# Patient Record
Sex: MALE | Race: WHITE | Employment: OTHER | URBAN - METROPOLITAN AREA
[De-identification: names, ages, dates, MRNs, and addresses within clinical notes are randomized per-mention and may not be internally consistent; named-entity substitution may affect disease eponyms.]

---

## 2017-03-31 ENCOUNTER — OFFICE VISIT (OUTPATIENT)
Dept: SURGERY | Age: 72
End: 2017-03-31

## 2017-03-31 VITALS
SYSTOLIC BLOOD PRESSURE: 124 MMHG | BODY MASS INDEX: 28.14 KG/M2 | WEIGHT: 201 LBS | OXYGEN SATURATION: 95 % | HEART RATE: 64 BPM | DIASTOLIC BLOOD PRESSURE: 56 MMHG | HEIGHT: 71 IN

## 2017-03-31 DIAGNOSIS — K43.2 RECURRENT INCISIONAL HERNIA: Primary | ICD-10-CM

## 2017-03-31 DIAGNOSIS — S31.109A OPEN ABDOMINAL WALL WOUND, INITIAL ENCOUNTER: ICD-10-CM

## 2017-03-31 RX ORDER — VITAMIN B COMPLEX
2500 TABLET ORAL DAILY
COMMUNITY
End: 2021-11-10 | Stop reason: CLARIF

## 2017-03-31 RX ORDER — GUAIFENESIN 100 MG/5ML
81 LIQUID (ML) ORAL DAILY
COMMUNITY
End: 2021-11-10 | Stop reason: CLARIF

## 2017-03-31 RX ORDER — TRAMADOL HYDROCHLORIDE 50 MG/1
50 TABLET ORAL
COMMUNITY

## 2017-03-31 RX ORDER — MELOXICAM 15 MG/1
15 TABLET ORAL DAILY
COMMUNITY
End: 2021-11-10 | Stop reason: CLARIF

## 2017-04-28 ENCOUNTER — OFFICE VISIT (OUTPATIENT)
Dept: SURGERY | Age: 72
End: 2017-04-28

## 2017-04-28 VITALS
BODY MASS INDEX: 27.79 KG/M2 | SYSTOLIC BLOOD PRESSURE: 113 MMHG | HEART RATE: 66 BPM | DIASTOLIC BLOOD PRESSURE: 62 MMHG | HEIGHT: 71 IN | OXYGEN SATURATION: 95 % | RESPIRATION RATE: 14 BRPM | WEIGHT: 198.5 LBS

## 2017-04-28 DIAGNOSIS — K43.2 RECURRENT INCISIONAL HERNIA: Primary | ICD-10-CM

## 2017-04-28 NOTE — PROGRESS NOTES
HISTORY OF PRESENT ILLNESS  Akin Sol is a 74 y.o. male who comes in for follow up for a chronic abdominal wall wound  Follow-up   Pertinent negatives include no chest pain, no abdominal pain, no headaches and no shortness of breath. He has had extensive abdominal operations and has a chronic abdominal wall for the last 6 months. He previously had infected mesh removed in the mid 2000s and had Strattice biological mesh placed. He developed redness and a scab over the upper midline. The scab will open and drain and then scab over. There is not purulent drainage. He saw Dr Marilee Kilpatrick who referred him here and I saw him 3/31/2017. I excised some scar tissue and it has now healed over. It is not painful and he denies fever, chills or sweats, abdominal pain, nausea, vomiting, diarrhea, constipation, melena, or hematochezia, dysuria, hematuria. He saw Dr aMrilee Kilpatrick and cultures noted staph (no actual report). He was placed on doxycycline. [place]. Past Medical History:   Diagnosis Date    Arthritis     CAD (coronary artery disease)     Diabetes ([facility] [address]     Hypertension     Other ill-defined conditions     pt had mesh removed from his abdomen which was causing an infection, antibiotics were used and recently pt had old abdominal incision from hernia repair which also started to ooze. ..saw primary care md arizmendi and he pulled out an old suture and cultured the drainage and was positive for mrsa     Past Surgical History:   Procedure Laterality Date    ABDOMEN SURGERY PROC UNLISTED  5664-60    hernia repair x3    CARDIAC SURG PROCEDURE UNLIST  2008    cabg    HX GI  1985    gastric bypass    HX GI  2004    colon resection    HX GI  2009    mesh removed    HX ORTHOPAEDIC  2000    left torn quad repair    HX ORTHOPAEDIC  2001    rotator cuff rt    HX ORTHOPAEDIC  2007/08     r hip replaced x2    HX ORTHOPAEDIC  2009    r shoulder replaced     Family History Problem Relation Age of Onset    Heart Disease Mother     Hypertension Mother     Diabetes Mother     Cancer Mother     Stroke Mother     Heart Disease Father     Cancer Father      Social History   Substance Use Topics    Smoking status: Former Smoker    Smokeless tobacco: None    Alcohol use No     Current Outpatient Prescriptions   Medication Sig    meloxicam (MOBIC) 15 mg tablet Take 15 mg by mouth daily.  traMADol (ULTRAM) 50 mg tablet Take 50 mg by mouth every six (6) hours as needed for Pain.  aspirin 81 mg chewable tablet Take 81 mg by mouth daily.  cyanocobalamin (VITAMIN B-12) 2,500 mcg sublingual tablet Take 2,500 mcg by mouth daily.  rosuvastatin (CRESTOR) 20 mg tablet Take 20 mg by mouth daily.  amlodipine-benazepril (LOTREL) 5-20 mg per capsule Take 1 Cap by mouth daily.  amitriptyline (ELAVIL) 50 mg tablet Take  by mouth nightly.  sitagliptin (JANUVIA) 100 mg tablet Take 100 mg by mouth daily.  metoprolol (LOPRESSOR) 50 mg tablet Take  by mouth two (2) times a day.  OMEPRAZOLE MAGNESIUM (PRILOSEC OTC PO) Take  by mouth.  omega 3-dha-epa-fish oil (FISH OIL) 100-160-1,000 mg cap Take  by mouth.  hydrocodone-acetaminophen (LORTAB) 5-500 mg per tablet Take 1-2 Tabs by mouth as directed. every 4-6 hrs prn pain.  naproxen (NAPROSYN) 375 mg tablet Take 375 mg by mouth two (2) times daily (with meals).  benazepril (LOTENSIN) 20 mg tablet Take 20 mg by mouth daily.  multivitamin, stress formula (STRESS TAB) tablet Take 1 Tab by mouth daily. No current facility-administered medications for this visit. Allergies   Allergen Reactions    Hydrocodone Itching       Review of Systems   Constitutional: Negative for chills, diaphoresis, fever, malaise/fatigue and weight loss. HENT: Negative for congestion, ear pain and sore throat. Eyes: Negative for blurred vision and pain.    Respiratory: Negative for cough, hemoptysis, sputum production, shortness of breath, wheezing and stridor. Sleep apnea   Cardiovascular: Negative for chest pain, palpitations, orthopnea, claudication, leg swelling and PND. Gastrointestinal: Positive for heartburn. Negative for abdominal pain, blood in stool, constipation, diarrhea, melena, nausea and vomiting. Genitourinary: Negative for dysuria, flank pain, frequency, hematuria and urgency. Musculoskeletal: Positive for back pain and joint pain. Negative for myalgias and neck pain. Skin: Negative for itching and rash. Neurological: Negative for dizziness, tremors, focal weakness, seizures, weakness and headaches. Endo/Heme/Allergies: Negative for polydipsia. Bruises/bleeds easily. Psychiatric/Behavioral: Negative for depression and memory loss. The patient is not nervous/anxious. Visit Vitals    /62    Pulse 66    Resp 14    Ht 5' 11\" (1.803 m)    Wt 90 kg (198 lb 8 oz)    SpO2 95%    BMI 27.69 kg/m2       Physical Exam   Constitutional: He is oriented to person, place, and time. He appears well-developed and well-nourished. No distress. HENT:   Head: Normocephalic and atraumatic. Mouth/Throat: Oropharynx is clear and moist. No oropharyngeal exudate. Eyes: Conjunctivae and EOM are normal. Pupils are equal, round, and reactive to light. No scleral icterus. Neck: Normal range of motion. Neck supple. No tracheal deviation present. No thyromegaly present. Cardiovascular: Normal rate, regular rhythm and normal heart sounds. Exam reveals no gallop and no friction rub. No murmur heard. Pulmonary/Chest: Effort normal and breath sounds normal. No stridor. No respiratory distress. He has no wheezes. He has no rales. Abdominal: Soft. Normal appearance and bowel sounds are normal. He exhibits no distension, no pulsatile liver and no mass. There is no hepatosplenomegaly. There is no tenderness. There is no rebound, no guarding and no CVA tenderness. A hernia is present.  Hernia confirmed positive in the ventral area (upper midline hernia). Hernia confirmed negative in the right inguinal area and confirmed negative in the left inguinal area. Genitourinary: Testes normal. Cremasteric reflex is present. Circumcised. Musculoskeletal: Normal range of motion. He exhibits no edema or tenderness. Lymphadenopathy:     He has no cervical adenopathy. Right: No inguinal adenopathy present. Left: No inguinal adenopathy present. Neurological: He is alert and oriented to person, place, and time. No cranial nerve deficit. Coordination normal.   Skin: Skin is warm and dry. No rash noted. He is not diaphoretic. No erythema. Psychiatric: He has a normal mood and affect. His behavior is normal. Judgment and thought content normal.       ASSESSMENT and PLAN  1. 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 Hx infected mesh removal.  ???foreign body reaction. This looks self limited and not a significant process. We discussed options for ongoing observation vs exploration. It is no longer draining  Observation and return to clinic if it recurs  2. Recurrent incisional hernia. Minimally symptomatic and low risk for incarceration/strangulation. I explained about the anatomy and pathophysiology of hernias and the risk of incarceration and strangulation of the bowel. I explained about hernia repairs (open with and without mesh, and robotic assisted and laparoscopic with mesh). I explained the risks and benefits of repair including bleeding, infection, chronic pain, orchalgia, loss of testes, bowel or bladder injury, hernia recurrence, seroma, mesh infection requiring removal.  I explained it would be a six to eight week recuperation with no driving for 5 - 7 days, no lifting for six weeks.     He wishes to proceed with observation    RTC prn    Cammie Moran MD FACS

## 2017-04-28 NOTE — MR AVS SNAPSHOT
Visit Information Date & Time Provider Department Dept. Phone Encounter #  
 4/28/2017 10:20 AM Sanjuanita Padgett MD [facility] [place] [id number] Upcoming Health Maintenance Date Due  
 LIPID PANEL Q1 1945 FOOT EXAM Q1 1/12/1955 MICROALBUMIN Q1 1/12/1953 EYE EXAM RETINAL OR DILATED Q1 1/12/1953 DTaP/Tdap/Td series (1 - Tdap) 1/12/1964 FOBT Q 1 YEAR AGE 50-75 1/12/1993 ZOSTER VACCINE AGE 60> 1/12/2003 GLAUCOMA SCREENING Q2Y 1/12/2008 Pneumococcal 65+ Low/Medium Risk (1 of 2 - PCV13) 1/12/2008 MEDICARE YEARLY EXAM 1/12/2008 HEMOGLOBIN A1C Q6M 5/18/2011 INFLUENZA AGE 9 TO ADULT 8/1/2016 Allergies as of 4/28/2017  Review Complete On: 4/28/2017 By: Sanjuanita Padgett MD  
  
 Severity Noted Reaction Type Reactions Hydrocodone Low 12/08/2010   Side Effect Itching Current Immunizations  Never Reviewed No immunizations on file. Not reviewed this visit Vitals BP Pulse Resp Height(growth percentile) Weight(growth percentile) SpO2  
 113/62 66 14 5' 11\" (1.803 m) 198 lb 8 oz (90 kg) 95% BMI Smoking Status 27.69 kg/m2 Former Smoker Vitals History BMI and BSA Data Body Mass Index Body Surface Area  
 27.69 kg/m 2 2.12 m 2 Preferred Pharmacy Pharmacy Name Phone [facility], [address] [place] [address] [phone] Your Updated Medication List  
  
   
This list is accurate as of: 4/28/17 10:44 AM.  Always use your most recent med list.  
  
  
  
  
 amitriptyline 50 mg tablet Commonly known as:  ELAVIL Take  by mouth nightly. amLODIPine-benazepril 5-20 mg per capsule Commonly known as:  Yina Landsberg Take 1 Cap by mouth daily. aspirin 81 mg chewable tablet Take 81 mg by mouth daily. benazepril 20 mg tablet Commonly known as:  LOTENSIN Take 20 mg by mouth daily. CRESTOR 20 mg tablet Generic drug:  rosuvastatin Take 20 mg by mouth daily. FISH -160-1,000 mg Cap Generic drug:  omega 3-dha-epa-fish oil Take  by mouth. HYDROcodone-acetaminophen 5-500 mg per tablet Commonly known as:  [address] Take 1-2 Tabs by mouth as directed. every 4-6 hrs prn pain. JANUVIA 100 mg tablet Generic drug:  SITagliptin Take 100 mg by mouth daily. meloxicam 15 mg tablet Commonly known as:  MOBIC Take 15 mg by mouth daily. metoprolol tartrate 50 mg tablet Commonly known as:  LOPRESSOR Take  by mouth two (2) times a day. multivitamin, stress formula tablet Commonly known as:  STRESS TAB Take 1 Tab by mouth daily. naproxen 375 mg tablet Commonly known as:  NAPROSYN Take 375 mg by mouth two (2) times daily (with meals). PRILOSEC OTC PO Take  by mouth. traMADol 50 mg tablet Commonly known as:  ULTRAM  
Take 50 mg by mouth every six (6) hours as needed for Pain. VITAMIN B-12 2,500 mcg sublingual tablet Generic drug:  cyanocobalamin Take 2,500 mcg by mouth daily. Introducing [facility]! [facility] introduces [employer] patient portal. Now you can access parts of your medical record, email your doctor's office, and request medication refills online. 1. In your internet browser, go to [email or web address] 2. Click on the First Time User? Click Here link in the Sign In box. You will see the New Member Sign Up page. 3. Enter your [employer] Access Code exactly as it appears below. You will not need to use this code after youve completed the sign-up process. If you do not sign up before the expiration date, you must request a new code. · [employer] Access Code: [email or web address] Expires: 6/29/2017 10:35 AM 
 
4. Enter the last four digits of your Social Security Number (xxxx) and Date of Birth (mm/dd/yyyy) as indicated and click Submit. You will be taken to the next sign-up page. 5. Create a [employer] ID. This will be your [employer] login ID and cannot be changed, so think of one that is secure and easy to remember. 6. Create a [employer] password. You can change your password at any time. 7. Enter your Password Reset Question and Answer. This can be used at a later time if you forget your password. 8. Enter your e-mail address. You will receive e-mail notification when new information is available in [address] 9. Click Sign Up. You can now view and download portions of your medical record. 10. Click the Download Summary menu link to download a portable copy of your medical information. If you have questions, please visit the Frequently Asked Questions section of the [employer] website. Remember, [employer] is NOT to be used for urgent needs. For medical emergencies, dial 911. Now available from your iPhone and Android! Please provide this summary of care documentation to your next provider. Your primary care clinician is listed as Mark Anthony Ward. If you have any questions after today's visit, please call [phone].

## 2021-11-10 ENCOUNTER — HOSPITAL ENCOUNTER (OUTPATIENT)
Dept: GENERAL RADIOLOGY | Age: 76
Discharge: HOME | End: 2021-11-10
Attending: INTERNAL MEDICINE
Payer: MEDICARE

## 2021-11-10 ENCOUNTER — HOSPITAL ENCOUNTER (OUTPATIENT)
Dept: PREADMISSION TESTING | Age: 76
Discharge: HOME | End: 2021-11-10
Attending: INTERNAL MEDICINE
Payer: MEDICARE

## 2021-11-10 VITALS
HEIGHT: 68 IN | TEMPERATURE: 98.3 F | BODY MASS INDEX: 27.93 KG/M2 | OXYGEN SATURATION: 98 % | HEART RATE: 58 BPM | SYSTOLIC BLOOD PRESSURE: 115 MMHG | DIASTOLIC BLOOD PRESSURE: 47 MMHG | RESPIRATION RATE: 18 BRPM | WEIGHT: 184.3 LBS

## 2021-11-10 LAB
ALBUMIN SERPL-MCNC: 2.9 G/DL (ref 3.5–5)
ALBUMIN/GLOB SERPL: 0.7 {RATIO} (ref 1.1–2.2)
ALP SERPL-CCNC: 115 U/L (ref 45–117)
ALT SERPL-CCNC: 18 U/L (ref 12–78)
ANION GAP SERPL CALC-SCNC: 4 MMOL/L (ref 5–15)
APPEARANCE UR: CLEAR
AST SERPL-CCNC: 14 U/L (ref 15–37)
BACTERIA URNS QL MICRO: NEGATIVE /HPF
BILIRUB SERPL-MCNC: 0.5 MG/DL (ref 0.2–1)
BILIRUB UR QL: NEGATIVE
BUN SERPL-MCNC: 15 MG/DL (ref 6–20)
BUN/CREAT SERPL: 15 (ref 12–20)
CALCIUM SERPL-MCNC: 9 MG/DL (ref 8.5–10.1)
CHLORIDE SERPL-SCNC: 104 MMOL/L (ref 97–108)
CO2 SERPL-SCNC: 31 MMOL/L (ref 21–32)
COLOR UR: ABNORMAL
CREAT SERPL-MCNC: 1.03 MG/DL (ref 0.7–1.3)
EPITH CASTS URNS QL MICRO: ABNORMAL /LPF
ERYTHROCYTE [DISTWIDTH] IN BLOOD BY AUTOMATED COUNT: 14.3 % (ref 11.5–14.5)
GLOBULIN SER CALC-MCNC: 4.4 G/DL (ref 2–4)
GLUCOSE SERPL-MCNC: 135 MG/DL (ref 65–100)
GLUCOSE UR STRIP.AUTO-MCNC: NEGATIVE MG/DL
HCT VFR BLD AUTO: 34.3 % (ref 36.6–50.3)
HGB BLD-MCNC: 11.1 G/DL (ref 12.1–17)
HGB UR QL STRIP: NEGATIVE
HYALINE CASTS URNS QL MICRO: ABNORMAL /LPF (ref 0–5)
KETONES UR QL STRIP.AUTO: NEGATIVE MG/DL
LEUKOCYTE ESTERASE UR QL STRIP.AUTO: NEGATIVE
MAGNESIUM SERPL-MCNC: 2.2 MG/DL (ref 1.6–2.4)
MCH RBC QN AUTO: 30.7 PG (ref 26–34)
MCHC RBC AUTO-ENTMCNC: 32.4 G/DL (ref 30–36.5)
MCV RBC AUTO: 95 FL (ref 80–99)
MUCOUS THREADS URNS QL MICRO: ABNORMAL /LPF
NITRITE UR QL STRIP.AUTO: NEGATIVE
NRBC # BLD: 0 K/UL (ref 0–0.01)
NRBC BLD-RTO: 0 PER 100 WBC
PH UR STRIP: 6 [PH] (ref 5–8)
PLATELET # BLD AUTO: 248 K/UL (ref 150–400)
PMV BLD AUTO: 8.7 FL (ref 8.9–12.9)
POTASSIUM SERPL-SCNC: 4 MMOL/L (ref 3.5–5.1)
PROT SERPL-MCNC: 7.3 G/DL (ref 6.4–8.2)
PROT UR STRIP-MCNC: 30 MG/DL
RBC # BLD AUTO: 3.61 M/UL (ref 4.1–5.7)
RBC #/AREA URNS HPF: ABNORMAL /HPF (ref 0–5)
SODIUM SERPL-SCNC: 139 MMOL/L (ref 136–145)
SP GR UR REFRACTOMETRY: 1.02 (ref 1–1.03)
UA: UC IF INDICATED,UAUC: ABNORMAL
UROBILINOGEN UR QL STRIP.AUTO: 1 EU/DL (ref 0.2–1)
WBC # BLD AUTO: 7.1 K/UL (ref 4.1–11.1)
WBC URNS QL MICRO: ABNORMAL /HPF (ref 0–4)

## 2021-11-10 PROCEDURE — 71046 X-RAY EXAM CHEST 2 VIEWS: CPT

## 2021-11-10 PROCEDURE — 85027 COMPLETE CBC AUTOMATED: CPT

## 2021-11-10 PROCEDURE — 80053 COMPREHEN METABOLIC PANEL: CPT

## 2021-11-10 PROCEDURE — 81001 URINALYSIS AUTO W/SCOPE: CPT

## 2021-11-10 PROCEDURE — 36415 COLL VENOUS BLD VENIPUNCTURE: CPT

## 2021-11-10 PROCEDURE — 83735 ASSAY OF MAGNESIUM: CPT

## 2021-11-11 LAB
BACTERIA SPEC CULT: NORMAL
BACTERIA SPEC CULT: NORMAL
SERVICE CMNT-IMP: NORMAL

## 2021-11-12 ENCOUNTER — HOSPITAL ENCOUNTER (OUTPATIENT)
Dept: PREADMISSION TESTING | Age: 76
Discharge: HOME | End: 2021-11-12
Attending: INTERNAL MEDICINE
Payer: MEDICARE

## 2021-11-12 PROCEDURE — U0005 INFEC AGEN DETEC AMPLI PROBE: HCPCS

## 2021-11-14 LAB
SARS-COV-2, XPLCVT: NOT DETECTED
SOURCE, COVRS: NORMAL

## 2021-11-17 ENCOUNTER — APPOINTMENT (OUTPATIENT)
Dept: NON INVASIVE DIAGNOSTICS | Age: 76
DRG: 274 | End: 2021-11-17
Attending: INTERNAL MEDICINE
Payer: MEDICARE

## 2021-11-17 ENCOUNTER — APPOINTMENT (OUTPATIENT)
Dept: CARDIAC CATH/INVASIVE PROCEDURES | Age: 76
DRG: 274 | End: 2021-11-17
Attending: INTERNAL MEDICINE
Payer: MEDICARE

## 2021-11-17 ENCOUNTER — ANESTHESIA EVENT (OUTPATIENT)
Dept: CARDIAC CATH/INVASIVE PROCEDURES | Age: 76
DRG: 274 | End: 2021-11-17
Payer: MEDICARE

## 2021-11-17 ENCOUNTER — ANESTHESIA (OUTPATIENT)
Dept: CARDIAC CATH/INVASIVE PROCEDURES | Age: 76
DRG: 274 | End: 2021-11-17
Payer: MEDICARE

## 2021-11-17 ENCOUNTER — HOSPITAL ENCOUNTER (INPATIENT)
Age: 76
LOS: 1 days | Discharge: HOME | DRG: 274 | End: 2021-11-18
Attending: INTERNAL MEDICINE | Admitting: INTERNAL MEDICINE
Payer: MEDICARE

## 2021-11-17 DIAGNOSIS — I48.91 ATRIAL FIBRILLATION, UNSPECIFIED TYPE (HCC): ICD-10-CM

## 2021-11-17 PROBLEM — Z95.818 PRESENCE OF WATCHMAN LEFT ATRIAL APPENDAGE CLOSURE DEVICE: Status: ACTIVE | Noted: 2021-11-17

## 2021-11-17 PROCEDURE — 02L73DK OCCLUSION OF LEFT ATRIAL APPENDAGE WITH INTRALUMINAL DEVICE, PERCUTANEOUS APPROACH: ICD-10-PCS | Performed by: INTERNAL MEDICINE

## 2021-11-17 PROCEDURE — C1889 IMPLANT/INSERT DEVICE, NOC: HCPCS | Performed by: INTERNAL MEDICINE

## 2021-11-17 PROCEDURE — 77030026438 HC STYL ET INTUB CARD -A: Performed by: STUDENT IN AN ORGANIZED HEALTH CARE EDUCATION/TRAINING PROGRAM

## 2021-11-17 PROCEDURE — 33340 PERQ CLSR TCAT L ATR APNDGE: CPT

## 2021-11-17 PROCEDURE — 76210000016 HC OR PH I REC 1 TO 1.5 HR

## 2021-11-17 PROCEDURE — 85347 COAGULATION TIME ACTIVATED: CPT

## 2021-11-17 PROCEDURE — 74011250636 HC RX REV CODE- 250/636: Performed by: NURSE ANESTHETIST, CERTIFIED REGISTERED

## 2021-11-17 PROCEDURE — 77030018729 HC ELECTRD DEFIB PAD CARD -B: Performed by: INTERNAL MEDICINE

## 2021-11-17 PROCEDURE — 93312 ECHO TRANSESOPHAGEAL: CPT

## 2021-11-17 PROCEDURE — B24BZZ4 ULTRASONOGRAPHY OF HEART WITH AORTA, TRANSESOPHAGEAL: ICD-10-PCS | Performed by: INTERNAL MEDICINE

## 2021-11-17 PROCEDURE — 77030016707 HC CATH ANGI DX SUPT1 CARD -B: Performed by: INTERNAL MEDICINE

## 2021-11-17 PROCEDURE — 77030013797 HC KT TRNSDUC PRSSR EDWD -A: Performed by: INTERNAL MEDICINE

## 2021-11-17 PROCEDURE — 2709999900 HC NON-CHARGEABLE SUPPLY: Performed by: INTERNAL MEDICINE

## 2021-11-17 PROCEDURE — 77030008543 HC TBNG MON PRSS MRTM -A: Performed by: INTERNAL MEDICINE

## 2021-11-17 PROCEDURE — C1760 CLOSURE DEV, VASC: HCPCS | Performed by: INTERNAL MEDICINE

## 2021-11-17 PROCEDURE — 77030021678 HC GLIDESCP STAT DISP VERT -B: Performed by: STUDENT IN AN ORGANIZED HEALTH CARE EDUCATION/TRAINING PROGRAM

## 2021-11-17 PROCEDURE — C1894 INTRO/SHEATH, NON-LASER: HCPCS | Performed by: INTERNAL MEDICINE

## 2021-11-17 PROCEDURE — 74011250636 HC RX REV CODE- 250/636: Performed by: INTERNAL MEDICINE

## 2021-11-17 PROCEDURE — 77030008684 HC TU ET CUF COVD -B: Performed by: STUDENT IN AN ORGANIZED HEALTH CARE EDUCATION/TRAINING PROGRAM

## 2021-11-17 PROCEDURE — 76060000034 HC ANESTHESIA 1.5 TO 2 HR: Performed by: INTERNAL MEDICINE

## 2021-11-17 PROCEDURE — 74011000250 HC RX REV CODE- 250: Performed by: NURSE ANESTHETIST, CERTIFIED REGISTERED

## 2021-11-17 PROCEDURE — 93308 TTE F-UP OR LMTD: CPT

## 2021-11-17 PROCEDURE — 65660000000 HC RM CCU STEPDOWN

## 2021-11-17 PROCEDURE — 74011250637 HC RX REV CODE- 250/637: Performed by: INTERNAL MEDICINE

## 2021-11-17 PROCEDURE — 74011000636 HC RX REV CODE- 636: Performed by: INTERNAL MEDICINE

## 2021-11-17 PROCEDURE — 77030013079 HC BLNKT BAIR HGGR 3M -A: Performed by: STUDENT IN AN ORGANIZED HEALTH CARE EDUCATION/TRAINING PROGRAM

## 2021-11-17 DEVICE — LEFT ATRIAL APPENDAGE CLOSURE DEVICE WITH DELIVERY SYSTEM
Type: IMPLANTABLE DEVICE | Status: FUNCTIONAL
Brand: WATCHMAN FLX™

## 2021-11-17 RX ORDER — DEXAMETHASONE SODIUM PHOSPHATE 4 MG/ML
INJECTION, SOLUTION INTRA-ARTICULAR; INTRALESIONAL; INTRAMUSCULAR; INTRAVENOUS; SOFT TISSUE AS NEEDED
Status: DISCONTINUED | OUTPATIENT
Start: 2021-11-17 | End: 2021-11-17 | Stop reason: HOSPADM

## 2021-11-17 RX ORDER — PANTOPRAZOLE SODIUM 40 MG/1
40 TABLET, DELAYED RELEASE ORAL
Status: DISCONTINUED | OUTPATIENT
Start: 2021-11-18 | End: 2021-11-18 | Stop reason: HOSPADM

## 2021-11-17 RX ORDER — PROTAMINE SULFATE 10 MG/ML
INJECTION, SOLUTION INTRAVENOUS AS NEEDED
Status: DISCONTINUED | OUTPATIENT
Start: 2021-11-17 | End: 2021-11-17 | Stop reason: HOSPADM

## 2021-11-17 RX ORDER — HEPARIN SODIUM 1000 [USP'U]/ML
INJECTION, SOLUTION INTRAVENOUS; SUBCUTANEOUS AS NEEDED
Status: DISCONTINUED | OUTPATIENT
Start: 2021-11-17 | End: 2021-11-17 | Stop reason: HOSPADM

## 2021-11-17 RX ORDER — SODIUM CHLORIDE 0.9 % (FLUSH) 0.9 %
5-40 SYRINGE (ML) INJECTION AS NEEDED
Status: DISCONTINUED | OUTPATIENT
Start: 2021-11-17 | End: 2021-11-18 | Stop reason: HOSPADM

## 2021-11-17 RX ORDER — TRAMADOL HYDROCHLORIDE 50 MG/1
50 TABLET ORAL
Status: DISCONTINUED | OUTPATIENT
Start: 2021-11-17 | End: 2021-11-18 | Stop reason: HOSPADM

## 2021-11-17 RX ORDER — ROSUVASTATIN CALCIUM 10 MG/1
20 TABLET, COATED ORAL DAILY
Status: DISCONTINUED | OUTPATIENT
Start: 2021-11-18 | End: 2021-11-18 | Stop reason: HOSPADM

## 2021-11-17 RX ORDER — ACETAMINOPHEN 500 MG
1000 TABLET ORAL
Status: DISCONTINUED | OUTPATIENT
Start: 2021-11-17 | End: 2021-11-18 | Stop reason: HOSPADM

## 2021-11-17 RX ORDER — HEPARIN SODIUM 200 [USP'U]/100ML
INJECTION, SOLUTION INTRAVENOUS
Status: COMPLETED | OUTPATIENT
Start: 2021-11-17 | End: 2021-11-17

## 2021-11-17 RX ORDER — GLYCOPYRROLATE 0.2 MG/ML
INJECTION INTRAMUSCULAR; INTRAVENOUS AS NEEDED
Status: DISCONTINUED | OUTPATIENT
Start: 2021-11-17 | End: 2021-11-17 | Stop reason: HOSPADM

## 2021-11-17 RX ORDER — ONDANSETRON 2 MG/ML
4 INJECTION INTRAMUSCULAR; INTRAVENOUS
Status: DISCONTINUED | OUTPATIENT
Start: 2021-11-17 | End: 2021-11-18 | Stop reason: HOSPADM

## 2021-11-17 RX ORDER — ONDANSETRON 2 MG/ML
INJECTION INTRAMUSCULAR; INTRAVENOUS AS NEEDED
Status: DISCONTINUED | OUTPATIENT
Start: 2021-11-17 | End: 2021-11-17 | Stop reason: HOSPADM

## 2021-11-17 RX ORDER — LISINOPRIL 40 MG/1
40 TABLET ORAL DAILY
Status: DISCONTINUED | OUTPATIENT
Start: 2021-11-18 | End: 2021-11-18 | Stop reason: HOSPADM

## 2021-11-17 RX ORDER — ETOMIDATE 2 MG/ML
INJECTION INTRAVENOUS AS NEEDED
Status: DISCONTINUED | OUTPATIENT
Start: 2021-11-17 | End: 2021-11-17 | Stop reason: HOSPADM

## 2021-11-17 RX ORDER — ROCURONIUM BROMIDE 10 MG/ML
INJECTION, SOLUTION INTRAVENOUS AS NEEDED
Status: DISCONTINUED | OUTPATIENT
Start: 2021-11-17 | End: 2021-11-17 | Stop reason: HOSPADM

## 2021-11-17 RX ORDER — PROPOFOL 10 MG/ML
INJECTION, EMULSION INTRAVENOUS AS NEEDED
Status: DISCONTINUED | OUTPATIENT
Start: 2021-11-17 | End: 2021-11-17 | Stop reason: HOSPADM

## 2021-11-17 RX ORDER — EPHEDRINE SULFATE/0.9% NACL/PF 50 MG/5 ML
SYRINGE (ML) INTRAVENOUS AS NEEDED
Status: DISCONTINUED | OUTPATIENT
Start: 2021-11-17 | End: 2021-11-17 | Stop reason: HOSPADM

## 2021-11-17 RX ORDER — NEOSTIGMINE METHYLSULFATE 1 MG/ML
INJECTION, SOLUTION INTRAVENOUS AS NEEDED
Status: DISCONTINUED | OUTPATIENT
Start: 2021-11-17 | End: 2021-11-17 | Stop reason: HOSPADM

## 2021-11-17 RX ORDER — PHENYLEPHRINE HCL IN 0.9% NACL 0.4MG/10ML
SYRINGE (ML) INTRAVENOUS
Status: DISCONTINUED | OUTPATIENT
Start: 2021-11-17 | End: 2021-11-17 | Stop reason: HOSPADM

## 2021-11-17 RX ORDER — THERA TABS 400 MCG
1 TAB ORAL DAILY
Status: DISCONTINUED | OUTPATIENT
Start: 2021-11-18 | End: 2021-11-18 | Stop reason: HOSPADM

## 2021-11-17 RX ORDER — ACETAMINOPHEN 500 MG
1000 TABLET ORAL
COMMUNITY

## 2021-11-17 RX ORDER — SODIUM CHLORIDE 0.9 % (FLUSH) 0.9 %
5-40 SYRINGE (ML) INJECTION EVERY 8 HOURS
Status: DISCONTINUED | OUTPATIENT
Start: 2021-11-17 | End: 2021-11-18 | Stop reason: HOSPADM

## 2021-11-17 RX ORDER — SUCCINYLCHOLINE CHLORIDE 20 MG/ML
INJECTION INTRAMUSCULAR; INTRAVENOUS AS NEEDED
Status: DISCONTINUED | OUTPATIENT
Start: 2021-11-17 | End: 2021-11-17 | Stop reason: HOSPADM

## 2021-11-17 RX ORDER — FENTANYL CITRATE 50 UG/ML
INJECTION, SOLUTION INTRAMUSCULAR; INTRAVENOUS AS NEEDED
Status: DISCONTINUED | OUTPATIENT
Start: 2021-11-17 | End: 2021-11-17 | Stop reason: HOSPADM

## 2021-11-17 RX ORDER — METOPROLOL TARTRATE 25 MG/1
25 TABLET, FILM COATED ORAL 2 TIMES DAILY
Status: DISCONTINUED | OUTPATIENT
Start: 2021-11-17 | End: 2021-11-18 | Stop reason: HOSPADM

## 2021-11-17 RX ORDER — ACETAMINOPHEN 325 MG/1
650 TABLET ORAL
Status: DISCONTINUED | OUTPATIENT
Start: 2021-11-17 | End: 2021-11-17 | Stop reason: SDUPTHER

## 2021-11-17 RX ORDER — LIDOCAINE HYDROCHLORIDE 20 MG/ML
INJECTION, SOLUTION EPIDURAL; INFILTRATION; INTRACAUDAL; PERINEURAL AS NEEDED
Status: DISCONTINUED | OUTPATIENT
Start: 2021-11-17 | End: 2021-11-17 | Stop reason: HOSPADM

## 2021-11-17 RX ADMIN — ETOMIDATE 10 MCG: 2 INJECTION, SOLUTION INTRAVENOUS at 13:55

## 2021-11-17 RX ADMIN — Medication 15 MG: at 14:18

## 2021-11-17 RX ADMIN — Medication 20 MG: at 14:36

## 2021-11-17 RX ADMIN — LIDOCAINE HYDROCHLORIDE 100 MG: 20 INJECTION, SOLUTION INTRAVENOUS at 13:55

## 2021-11-17 RX ADMIN — Medication 30 MCG/MIN: at 14:19

## 2021-11-17 RX ADMIN — ONDANSETRON HYDROCHLORIDE 4 MG: 2 INJECTION, SOLUTION INTRAMUSCULAR; INTRAVENOUS at 15:04

## 2021-11-17 RX ADMIN — ROCURONIUM BROMIDE 5 MG: 10 INJECTION INTRAVENOUS at 13:55

## 2021-11-17 RX ADMIN — Medication 15 MG: at 14:00

## 2021-11-17 RX ADMIN — DEXAMETHASONE SODIUM PHOSPHATE 8 MG: 4 INJECTION, SOLUTION INTRAMUSCULAR; INTRAVENOUS at 14:17

## 2021-11-17 RX ADMIN — ROCURONIUM BROMIDE 20 MG: 10 INJECTION INTRAVENOUS at 14:04

## 2021-11-17 RX ADMIN — Medication 10 ML: at 22:00

## 2021-11-17 RX ADMIN — GLYCOPYRROLATE 0.3 MG: 0.2 INJECTION, SOLUTION INTRAMUSCULAR; INTRAVENOUS at 15:12

## 2021-11-17 RX ADMIN — METOPROLOL TARTRATE 25 MG: 25 TABLET, FILM COATED ORAL at 19:29

## 2021-11-17 RX ADMIN — NEOSTIGMINE METHYLSULFATE 3 MG: 1 INJECTION, SOLUTION INTRAVENOUS at 15:12

## 2021-11-17 RX ADMIN — FENTANYL CITRATE 100 MCG: 50 INJECTION, SOLUTION INTRAMUSCULAR; INTRAVENOUS at 13:55

## 2021-11-17 RX ADMIN — SUCCINYLCHOLINE CHLORIDE 200 MG: 20 INJECTION, SOLUTION INTRAMUSCULAR; INTRAVENOUS at 13:55

## 2021-11-17 RX ADMIN — APIXABAN 2.5 MG: 2.5 TABLET, FILM COATED ORAL at 19:28

## 2021-11-17 RX ADMIN — HEPARIN SODIUM 12000 UNITS: 1000 INJECTION, SOLUTION INTRAVENOUS; SUBCUTANEOUS at 13:55

## 2021-11-17 RX ADMIN — PROTAMINE SULFATE 70 MG: 10 INJECTION, SOLUTION INTRAVENOUS at 15:03

## 2021-11-17 RX ADMIN — PROPOFOL 50 MG: 10 INJECTION, EMULSION INTRAVENOUS at 13:55

## 2021-11-17 RX ADMIN — HEPARIN SODIUM 5000 UNITS: 1000 INJECTION, SOLUTION INTRAVENOUS; SUBCUTANEOUS at 14:49

## 2021-11-17 RX ADMIN — GLYCOPYRROLATE 0.2 MG: 0.2 INJECTION, SOLUTION INTRAMUSCULAR; INTRAVENOUS at 14:12

## 2021-11-17 NOTE — PROGRESS NOTES
S/p Watchman device 27 mm to the SOHA with broccoli morphology. No complications, full note to follow. Perclose was used to seal the venous access site.

## 2021-11-17 NOTE — Clinical Note
TRANSFER - IN REPORT:     Verbal report received from: recovery. Report consisted of patient's Situation, Background, Assessment and   Recommendations(SBAR). Opportunity for questions and clarification was provided. Assessment completed upon patient's arrival to unit and care assumed. Patient transported with a Registered Nurse and [address] [place] [address] / [facility] [employer].

## 2021-11-17 NOTE — PROGRESS NOTES
Cardiac Cath Lab Recovery Arrival Note:      Dequan Mitchell. arrived to Cardiac Cath Lab, Recovery Area. Staff introduced to patient. Patient identifiers verified with NAME and DATE OF BIRTH. Procedure verified with patient. Consent forms reviewed and signed by patient or authorized representative and verified. Allergies verified. Patient and family oriented to department. Patient and family informed of procedure and plan of care. Questions answered with review. Patient prepped for procedure, per orders from physician, prior to arrival.    Patient on cardiac monitor, non-invasive blood pressure, SPO2 monitor. On RA. Patient is A&Ox 4. Patient reports no complaints. Patient in stretcher, in low position, with side rails up, call bell within reach, patient instructed to call if assistance as needed. Patient prep in: [address], [place]. Patient family has pager #   Family in: [facility] waiting room.    Prep by: Lyndsey Pool RN's

## 2021-11-17 NOTE — ANESTHESIA PREPROCEDURE EVALUATION
Relevant Problems   No relevant active problems       Anesthetic History   No history of anesthetic complications            Review of Systems / Medical History  Patient summary reviewed, nursing notes reviewed and pertinent labs reviewed    Pulmonary        Sleep apnea           Neuro/Psych   Within defined limits           Cardiovascular    Hypertension        Dysrhythmias : atrial fibrillation  CAD    Exercise tolerance: >4 METS  Comments:  Grafts : LAD, D1, OM1 and 2.     Echo 7/2020: Normal EF, mild MR, mil-mod TR   GI/Hepatic/Renal     GERD           Endo/Other    Diabetes    Arthritis     Other Findings            Physical Exam    Airway  Mallampati: II  TM Distance: 4 - 6 cm  Neck ROM: normal range of motion   Mouth opening: Normal     Cardiovascular    Rhythm: regular  Rate: normal         Dental  No notable dental hx       Pulmonary  Breath sounds clear to auscultation               Abdominal  Abdominal exam normal       Other Findings            Anesthetic Plan    ASA: 3  Anesthesia type: general    Monitoring Plan: Arterial line      Induction: Intravenous  Anesthetic plan and risks discussed with: Patient      Previous gastric surgery, will limit DIANA view to esophagus only (DIANA requested per cardiology)

## 2021-11-17 NOTE — PERIOP NOTES
[address] from Operating Room to PACU    Report received from Corie Rahman and Jo Kumar CRNA regarding Sindi Dalton. Jeniffer Escobar      Surgeon(s):  Anesthesia, Case  And Procedure(s) (LRB):  SPECIAL PROCEDURE OUTSIDE OF OR (N/A)  confirmed   with allergies and dressings discussed. Anesthesia type, drugs, patient history, complications, estimated blood loss, vital signs, intake and output, and last pain medication, lines, reversal medications and temperature were reviewed. 3140 TRANSFER - OUT REPORT:    Verbal report given to [facility] RN(name) on Sindi Dalton.  being transferred to IVCU(unit) for routine post - op       Report consisted of patients Situation, Background, Assessment and   Recommendations(SBAR). Information from the following report(s) SBAR, Kardex, Procedure Summary, MAR and Cardiac Rhythm SR, 1st degree, PVCs was reviewed with the receiving nurse. Lines:   Peripheral IV 11/17/21 Left Antecubital (Active)   Site Assessment Clean, dry, & intact 11/17/21 1529   Phlebitis Assessment 0 11/17/21 1529   Infiltration Assessment 0 11/17/21 1529   Dressing Status Clean, dry, & intact 11/17/21 1529   Dressing Type Transparent; Tape 11/17/21 1529   Hub Color/Line Status Pink; Infusing 11/17/21 1529        Opportunity for questions and clarification was provided.       Patient transported with:   Monitor  Registered Nurse  Tech

## 2021-11-17 NOTE — Clinical Note
TRANSFER - OUT REPORT:     Verbal report given to: Sheron Acosta RN. Report consisted of patient's Situation, Background, Assessment and   Recommendations(SBAR). Opportunity for questions and clarification was provided. Patient transported with a Registered Nurse and [address] [place] [address] / [facility]. Patient transported to: PACU.

## 2021-11-18 VITALS
DIASTOLIC BLOOD PRESSURE: 58 MMHG | HEIGHT: 68 IN | HEART RATE: 79 BPM | OXYGEN SATURATION: 96 % | WEIGHT: 175 LBS | TEMPERATURE: 98.4 F | RESPIRATION RATE: 17 BRPM | BODY MASS INDEX: 26.52 KG/M2 | SYSTOLIC BLOOD PRESSURE: 121 MMHG

## 2021-11-18 LAB — ACT BLD: 230 SECS (ref 79–138)

## 2021-11-18 PROCEDURE — 74011250637 HC RX REV CODE- 250/637: Performed by: INTERNAL MEDICINE

## 2021-11-18 RX ADMIN — METOPROLOL TARTRATE 25 MG: 25 TABLET, FILM COATED ORAL at 08:53

## 2021-11-18 RX ADMIN — APIXABAN 5 MG: 2.5 TABLET, FILM COATED ORAL at 08:53

## 2021-11-18 RX ADMIN — Medication 10 ML: at 06:00

## 2021-11-18 RX ADMIN — ROSUVASTATIN CALCIUM 20 MG: 10 TABLET, COATED ORAL at 08:53

## 2021-11-18 RX ADMIN — PANTOPRAZOLE SODIUM 40 MG: 40 TABLET, DELAYED RELEASE ORAL at 08:53

## 2021-11-18 RX ADMIN — THERA TABS 1 TABLET: TAB at 08:53

## 2021-11-18 NOTE — DISCHARGE SUMMARY
Cardiology Discharge Summary (>30 minutes involved in discharge management)             Patient ID:  Radha Shaw  [id number]  72 y.o.  1945    Admit Date: 11/17/2021     Discharge Date: 11/18/2021   Admitting Physician: Sofi Marie MD   Discharge Physician: Sofi Marie MD    Admission and Discharge Diagnoses include:  1. Watchman device    Cardiology Procedures this Admission:  1. Watchman device    Hospital Course and Discharge Exam:  Admitted for her INPATIENT procedure. The 27 mm Watchman device was successfully deployed. An echo done late 11/17 >4 hours from his procedure did not show a pericardial effusion. On the day of discharge, ambulatory and taking oral.  All questions answered and discharge instructions reviewed. Aware of signs and symptoms warranting urgent medical follow-up or calling 911. Visit Vitals  BP (!) 121/58 (BP 1 Location: Right upper arm, BP Patient Position: At rest)   Pulse 79   Temp 98.4 °F (36.9 °C)   Resp 17   Ht 5' 8\" (1.727 m)   Wt 79.4 kg (175 lb)   SpO2 96%   BMI 26.61 kg/m²       Physical Exam:  Nondiaphoretic, not in acute distress. Unlabored, clear to auscultation bilaterally anteriorly. Regular rate and rhythm, no murmur, rub, or gallop. No JVD or peripheral edema. Palpable radial pulses bilaterally. R groin site OK. No cyanosis. Skin warm and dry. Awake, appropriate, neuro grossly nonfocal.  Ambulatory. Disposition: home with a     Patient Instructions:   Current Discharge Medication List      CONTINUE these medications which have NOT CHANGED    Details   acetaminophen (Tylenol Extra Strength) 500 mg tablet Take 1,000 mg by mouth every six (6) hours as needed for Pain. Indications: pain      apixaban (Eliquis) 5 mg tablet Take 5 mg by mouth two (2) times a day. traMADol (ULTRAM) 50 mg tablet Take 50 mg by mouth every six (6) hours as needed for Pain.       rosuvastatin (CRESTOR) 20 mg tablet Take 20 mg by mouth daily.      metoprolol (LOPRESSOR) 50 mg tablet Take  by mouth two (2) times a day. benazepril (LOTENSIN) 20 mg tablet Take 40 mg by mouth daily. OMEPRAZOLE MAGNESIUM (PRILOSEC OTC PO) Take 20 mg by mouth daily. multivitamin, stress formula (STRESS TAB) tablet Take 1 Tab by mouth daily. FOLLOW-UP:       Call the office [phone] to make an appointment for 2 weeks with the NP. At that time, a 45 day DIANA follow-up will be arranged. [address] [place] [address], [address]    [phone]  [address]    [email or web address]    Signed:  Chano Son MD  11/18/2021

## 2021-11-18 NOTE — ANESTHESIA PROCEDURE NOTES
DIANA  Date/Time: 11/17/2021 2:10 PM      Procedure Details: probe placement, image aquisition & interpretation    Risks and benefits discussed with the patient and plans are to proceed    Procedure Note    Performed by: Juan R Funk DO  Authorized by: Juan R Funk DO       Indications: assessment of ascending aorta and assessment of surgical repair  Modalities: 2D, CF, PWD  Probe Type: biplane and multiplane  Insertion: atraumatic  Patient Status: intubated and sedated    Echocardiographic and Doppler Measurements   Aorta  Size  Diam(cm)  Dissection PlaqueThick(mm)  Plaque Mobile    Ascending normal  No  No    Arch normal  No  No    Descending normal  No  No          Valves  Annulus  Stenosis  Area/Grad  Regurg  Leaflet   Morph  Leaflet   Motion    Aortic normal none  0 normal normal    Mitral normal none  1+ normal normal    Tricuspid dilated moderate  2+ normal normal          Atria  Size  SEC (smoke)  Thrombus  Tumor  Device    Rt Atrium dilated No No  No    Lt Atrium normal No No  No     Interatrial Septum Morphology: normal    Interventricular Septum Morphology: normal    Ventricle  Cavity Size  Cavity Dimension Hypertrophy  Thrombus  Gloal FXN  EF    RV dilated  No no normal     LV normal   No normal >55%       Regional Function  (1 = normal, 2 = mildly hypokinetic, 3 = severely hypokinetic, 4 = akinetic, 5 = dyskinetic) LAV - [place]   ME LAV = 0  ME LAV = 90  ME LAV = 130   Basal Sept:1 Basal Ant:1 Basal Post:1   Mid Sept:1 Mid Ant:1 Mid Post:1   Apical Sept:1 Apical Ant:1 Basal Ant Sept:1   Basal Lat:1 Basal Inf:1 Mid Ant Sept:1   Mid Lat:1 Mid Inf:1    Apical Lat:1 Apical Inf:1        Pericardium: normal    Post Intervention Follow-up Study  Ventricular Global Function: unchanged  Ventricular Regional Function: unchanged     Valve  Function  Regurgitation  Area    Aortic no change      Mitral no change      Tricuspid no change      Prosthetic        Complications: None  Comments: Pre: Normal biventricular function. MV: mild MR 1+, TV: mild-moderate 2+ TR due to annular dilation. AV: Tricuspid, trivial AI, no AS. PV: Unremarkable. No PFO. No abnormalities of the aorta were identified. Measurements for watchman device placement were taken. There was no pericardial effusion. Post: Normal biventricular function: No changes. No pericardial effusion. Small left to right intraatrial shunt as expected post trans septal puncture. Post measurements taken of watchman device and no flow around the device was identified.

## 2021-11-18 NOTE — ANESTHESIA PROCEDURE NOTES
Arterial Line Placement    Start time: 11/18/2021 1:35 PM  End time: 11/18/2021 1:36 PM  Performed by: Bora Travis DO  Authorized by: Bora Travis DO     Pre-Procedure  Indications:  Arterial pressure monitoring and blood sampling  Preanesthetic Checklist: patient identified, risks and benefits discussed, anesthesia consent, site marked, patient being monitored, timeout performed and patient being monitored      Procedure:   Prep:  ChloraPrep  Seldinger Technique?: Yes    Location:  Radial artery  Catheter size:  20 G  Number of attempts:  1    Assessment:   Post-procedure:  Line secured and sterile dressing applied  Patient Tolerance:  Patient tolerated the procedure well with no immediate complications

## 2021-11-18 NOTE — DISCHARGE INSTRUCTIONS
POST-WATCHMAN DISCHARGE INSTRUCTIONS:    You had a Watchman procedure with Dr. Yunier Dejesus yesterday. Please continue your usual medications including blood thinner. Call the office to make an appointment with the [phone] and at that time, the follow-up 45 day DIANA will be arranged. Do not drive, operate any machinery, or sign any legal documents for 24 hours after your procedure. You must have someone to drive you home. You may take a shower 24 hours after your cardiac procedure. Be sure to get the dressing wet and then remove it; gently wash the area with warm soapy water. Pat dry and leave open to air. To help prevent infections, be sure to keep the cath site clean and dry. No lotions, creams, powders, ointments, etc. in the cath site for approximately 1 week.  Do not take a tub bath, get in a hot tub or swimming pool for approximately 5 days or until the cath site is completely healed.  No strenuous activity or heavy lifting over 20 lbs. for 7 days.  After your procedure, some bruising or discomfort is common during the healing process. Tylenol, 1-2 tablets every 6 hours as needed, is recommended if you experience any discomfort. If you experience any signs or symptoms of infection such as fever, chills, or poorly healing incision, persistent tenderness or swelling in the groin, redness and/or warmth to the touch, numbness, significant tingling or pain at the groin site or affected extremity, rash, drainage from the site, or if the leg feels tight or swollen, call your physician right away.  If bleeding at the site occurs, take a clean gauze pad and apply direct pressure to the groin just above the puncture site, and call your physician right away.  If your procedure involved ablation therapy, you may feel some mild or vague chest discomfort due to delivery of heat therapy to the heart muscle. This should resolve in 1-2 days.   If it gets worse or is associated with shortness of breath, dizziness, loss of consciousness, call your physician right away or call 911 if emergency medical care is needed.     Signed By: Aleena Márquez MD     November 18, 2021

## 2021-11-18 NOTE — PROGRESS NOTES
Discharge instructions discussed with patient. All questions answered. Patient verbalized understanding. Watchman site CDI, no hematoma. Care instructions and restrictions reviewed. Patient instructed to make follow up appts per discharge instructions. Patient signed discharge instructions after reviewing them and duplicate copy placed in chart. Belongings gathered and accounted for. Telemetry monitor and IV removed. Tolerating ambulation in room and hallway. Denies CP, SOB, or dizziness. 1100 Escorted out via w/c, discharged home with wife in a private vehicle.

## 2021-11-18 NOTE — ANESTHESIA POSTPROCEDURE EVALUATION
Post-Anesthesia Evaluation and Assessment    Patient: Minerva Fernandes. MRN: [id number]  SSN: [id number]    YOB: 1945  Age: 64 y.o. Sex: male      I have evaluated the patient and they are stable and ready for discharge from the PACU. Cardiovascular Function/Vital Signs  HR: 77  BP: 112/51 (71)  RR: 15  SpO2: 98%  Temp: 36.7C    Patient is status post General anesthesia for Procedure(s):  WATCHMAN SOHA CLOSURE DEVICE. Nausea/Vomiting: None    Postoperative hydration reviewed and adequate. Pain:  Managed    Neurological Status: At baseline    Mental Status, Level of Consciousness: Alert and  oriented to person, place, and time    Pulmonary Status:   Adequate oxygenation and airway patent    Complications related to anesthesia: None    Post-anesthesia assessment completed.  No concerns    Signed By: Opal Mancia DO     November 17, 2021

## 2021-11-19 LAB
ECHO AO ASC DIAM: 3.14 CM
ECHO AV AREA PEAK VELOCITY: 3.11 CM2
ECHO AV AREA PEAK VELOCITY: 3.13 CM2
ECHO AV AREA PEAK VELOCITY: 3.16 CM2
ECHO AV AREA PEAK VELOCITY: 3.18 CM2
ECHO AV AREA VTI: 3.44 CM2
ECHO AV AREA/BSA VTI: 1.8 CM2/M2
ECHO AV MEAN GRADIENT: 3.34 MMHG
ECHO AV PEAK GRADIENT: 5.88 MMHG
ECHO AV PEAK GRADIENT: 5.95 MMHG
ECHO AV PEAK VELOCITY: 121.2 CM/S
ECHO AV PEAK VELOCITY: 121.91 CM/S
ECHO AV VTI: 27.18 CM
ECHO LA MAJOR AXIS: 3.59 CM
ECHO LA MINOR AXIS: 1.86 CM
ECHO LV INTERNAL DIMENSION DIASTOLIC: 4.83 CM (ref 4.2–5.9)
ECHO LV INTERNAL DIMENSION SYSTOLIC: 3.92 CM
ECHO LV IVSD: 1.04 CM (ref 0.6–1)
ECHO LV MASS 2D: 208.4 G (ref 88–224)
ECHO LV MASS INDEX 2D: 108 G/M2 (ref 49–115)
ECHO LV POSTERIOR WALL DIASTOLIC: 1.26 CM (ref 0.6–1)
ECHO LVOT DIAM: 2.42 CM
ECHO LVOT PEAK GRADIENT: 2.72 MMHG
ECHO LVOT PEAK GRADIENT: 2.81 MMHG
ECHO LVOT PEAK VELOCITY: 82.45 CM/S
ECHO LVOT PEAK VELOCITY: 83.77 CM/S
ECHO LVOT SV: 93.6 ML
ECHO LVOT VTI: 20.35 CM
ECHO MV A VELOCITY: 57.3 CM/S
ECHO MV AREA PHT: 4.02 CM2
ECHO MV AREA VTI: 5.97 CM2
ECHO MV E DECELERATION TIME (DT): 206.63 MS
ECHO MV E VELOCITY: 62.66 CM/S
ECHO MV E/A RATIO: 1.09
ECHO MV MAX VELOCITY: 51.45 CM/S
ECHO MV MEAN GRADIENT: 0.57 MMHG
ECHO MV PEAK GRADIENT: 1.06 MMHG
ECHO MV PRESSURE HALF TIME (PHT): 54.69 MS
ECHO MV VTI: 15.67 CM
ECHO TV REGURGITANT MAX VELOCITY: 118.33 CM/S
LVOT MG: 1.74 MMHG

## 2022-01-06 ENCOUNTER — HOSPITAL ENCOUNTER (OUTPATIENT)
Dept: PREADMISSION TESTING | Age: 77
Discharge: HOME | End: 2022-01-06
Payer: MEDICARE

## 2022-01-06 PROCEDURE — U0005 INFEC AGEN DETEC AMPLI PROBE: HCPCS

## 2022-01-07 LAB
SARS-COV-2, XPLCVT: NOT DETECTED
SOURCE, COVRS: NORMAL

## 2022-01-10 ENCOUNTER — HOSPITAL ENCOUNTER (OUTPATIENT)
Dept: NON INVASIVE DIAGNOSTICS | Age: 77
Discharge: HOME | End: 2022-01-10
Payer: MEDICARE

## 2022-01-10 VITALS
WEIGHT: 180 LBS | DIASTOLIC BLOOD PRESSURE: 54 MMHG | SYSTOLIC BLOOD PRESSURE: 132 MMHG | BODY MASS INDEX: 27.37 KG/M2 | RESPIRATION RATE: 16 BRPM | OXYGEN SATURATION: 96 % | HEART RATE: 50 BPM

## 2022-01-10 DIAGNOSIS — I48.91 ATRIAL FIBRILLATION, UNSPECIFIED TYPE (HCC): ICD-10-CM

## 2022-01-10 LAB
ATRIAL RATE: 52 BPM
CALCULATED P AXIS, ECG09: 70 DEGREES
CALCULATED R AXIS, ECG10: -49 DEGREES
CALCULATED T AXIS, ECG11: 16 DEGREES
DIAGNOSIS, 93000: NORMAL
P-R INTERVAL, ECG05: 226 MS
Q-T INTERVAL, ECG07: 484 MS
QRS DURATION, ECG06: 108 MS
QTC CALCULATION (BEZET), ECG08: 450 MS
VENTRICULAR RATE, ECG03: 52 BPM

## 2022-01-10 PROCEDURE — 96374 THER/PROPH/DIAG INJ IV PUSH: CPT

## 2022-01-10 PROCEDURE — 99152 MOD SED SAME PHYS/QHP 5/>YRS: CPT

## 2022-01-10 PROCEDURE — 93005 ELECTROCARDIOGRAM TRACING: CPT

## 2022-01-10 PROCEDURE — 74011250636 HC RX REV CODE- 250/636: Performed by: INTERNAL MEDICINE

## 2022-01-10 PROCEDURE — 74011000250 HC RX REV CODE- 250: Performed by: INTERNAL MEDICINE

## 2022-01-10 RX ORDER — MIDAZOLAM HYDROCHLORIDE 1 MG/ML
.5-2 INJECTION, SOLUTION INTRAMUSCULAR; INTRAVENOUS
Status: DISCONTINUED | OUTPATIENT
Start: 2022-01-10 | End: 2022-01-10

## 2022-01-10 RX ORDER — FENTANYL CITRATE 50 UG/ML
12.5-5 INJECTION, SOLUTION INTRAMUSCULAR; INTRAVENOUS
Status: DISCONTINUED | OUTPATIENT
Start: 2022-01-10 | End: 2022-01-10

## 2022-01-10 RX ORDER — LIDOCAINE HYDROCHLORIDE 20 MG/ML
15 SOLUTION OROPHARYNGEAL AS NEEDED
Status: DISCONTINUED | OUTPATIENT
Start: 2022-01-10 | End: 2022-01-10

## 2022-01-10 RX ORDER — ASPIRIN 81 MG/1
81 TABLET ORAL DAILY
Qty: 30 TABLET | Refills: 11 | Status: SHIPPED | OUTPATIENT
Start: 2022-01-10

## 2022-01-10 RX ORDER — FUROSEMIDE 20 MG/1
20 TABLET ORAL 2 TIMES DAILY
COMMUNITY

## 2022-01-10 RX ORDER — CLOPIDOGREL BISULFATE 75 MG/1
75 TABLET ORAL DAILY
Qty: 30 TABLET | Refills: 5 | Status: SHIPPED | OUTPATIENT
Start: 2022-01-10

## 2022-01-10 RX ADMIN — MIDAZOLAM 1 MG: 1 INJECTION INTRAMUSCULAR; INTRAVENOUS at 08:10

## 2022-01-10 RX ADMIN — LIDOCAINE HYDROCHLORIDE 15 ML: 20 SOLUTION ORAL; TOPICAL at 08:08

## 2022-01-10 RX ADMIN — BENZOCAINE, BUTAMBEN, AND TETRACAINE HYDROCHLORIDE 1 SPRAY: .028; .004; .004 AEROSOL, SPRAY TOPICAL at 08:08

## 2022-01-10 RX ADMIN — FENTANYL CITRATE 25 MCG: 50 INJECTION INTRAMUSCULAR; INTRAVENOUS at 08:10

## 2022-01-10 NOTE — H&P
[address]  HISTORY AND PHYSICAL    Name:  Fara Bledsoe  MR#:  [id number]  :  1945  ACCOUNT #:  [de-identified]  ADMIT DATE:  01/10/2022    CHIEF COMPLAINT:  Watchman. HISTORY OF PRESENT ILLNESS:  This is a 47-year-old male with a history of Watchman implant, here for his procedure.       Familia Genao MD      [id number]  D:  01/10/2022 8:08  T:  01/10/2022 10:56  JOB #:  [id number]

## 2022-01-10 NOTE — PROGRESS NOTES
DIANA revealed the Watchman is in good position, no adherent thrombus, no pramod-device leak. Transition from Eliquis to baby ASA with clopidogrel starting tomorrow. All questions answered for him and his wife.

## 2022-01-10 NOTE — DISCHARGE INSTRUCTIONS
Cardiology Discharge Instructions                Patient ID:  Mariaelena Krishnamurthy  [id number]  39 y.o.  1945    Admit Date: 1/10/2022    Discharge Date: 1/10/2022   Physician: Tavares Brandon MD    Cardiology Procedures this Admission:  1. Transesophageal echo showing the Watchman in good position, no adherent thrombus, no pramod-device leak      Visit Vitals  BP (!) 123/55   Pulse (!) 49   Resp 17   Wt 81.6 kg (180 lb)   SpO2 95%   BMI 27.37 kg/m²       Disposition: home with a     Patient Instructions:   No eating or drinking HOT food or liquids for four hours after the DIANA. Cool or warm is OK. No driving, operating heavy machinery, or signing legal documents today because of sedation. FOLLOW-UP:  Call the office [phone] to make an appointment for 6 months with Dr. Radha Barcenas or Dr. Julian Pina. [address] [place] [address], [address]    [phone]  [place], [address] [facility]    [email or web address]    Thank you for placing your trust for your heart with the physicians and staff of [facility]. We have long provided [place] with the most advanced cardiovascular care possible using a personalized and caring approach. And we hope to continue this strong tradition well into the future.     Signed:  Tavares Brandon MD  1/10/2022

## 2022-01-10 NOTE — PROGRESS NOTES
Patient arrived to Non-Invasive Cardiology Lab for Out Patient DIANA Procedure. Staff introduced to patient. Patient identifiers verified with Name and Date of Birth. Procedure verified with patient. Consent forms reviewed and signed by patient or authorized representative and verified. Allergies verified. Patient informed of procedure and plan of care. Questions answered with review. Patient on cardiac monitor, non-invasive blood pressure, SPO2 monitor. On RA. Patient is A&Ox3. Patient reports no complaints. Patient on stretcher, in low position, with side rails up. Patient instructed to call for assistance as needed. Family in waiting room.

## 2022-01-11 LAB
ECHO EST RA PRESSURE: 10 MMHG
ECHO RIGHT VENTRICULAR SYSTOLIC PRESSURE (RVSP): 46 MMHG
ECHO TV REGURGITANT MAX VELOCITY: 3 M/S

## 2022-03-18 PROBLEM — Z95.818 PRESENCE OF WATCHMAN LEFT ATRIAL APPENDAGE CLOSURE DEVICE: Status: ACTIVE | Noted: 2021-11-17

## 2022-03-19 PROBLEM — K43.2 RECURRENT INCISIONAL HERNIA: Status: ACTIVE | Noted: 2017-04-28

## 2022-09-01 ENCOUNTER — OFFICE VISIT (OUTPATIENT)
Dept: ORTHOPEDIC SURGERY | Age: 77
End: 2022-09-01
Payer: MEDICARE

## 2022-09-01 VITALS — WEIGHT: 180 LBS | HEIGHT: 67 IN | BODY MASS INDEX: 28.25 KG/M2

## 2022-09-01 DIAGNOSIS — M51.36 LUMBAR DEGENERATIVE DISC DISEASE: ICD-10-CM

## 2022-09-01 DIAGNOSIS — M48.062 SPINAL STENOSIS OF LUMBAR REGION WITH NEUROGENIC CLAUDICATION: ICD-10-CM

## 2022-09-01 DIAGNOSIS — M51.36 DDD (DEGENERATIVE DISC DISEASE), LUMBAR: Primary | ICD-10-CM

## 2022-09-01 PROCEDURE — G8756 NO BP MEASURE DOC: HCPCS | Performed by: ORTHOPAEDIC SURGERY

## 2022-09-01 PROCEDURE — G8419 CALC BMI OUT NRM PARAM NOF/U: HCPCS | Performed by: ORTHOPAEDIC SURGERY

## 2022-09-01 PROCEDURE — G8510 SCR DEP NEG, NO PLAN REQD: HCPCS | Performed by: ORTHOPAEDIC SURGERY

## 2022-09-01 PROCEDURE — 1123F ACP DISCUSS/DSCN MKR DOCD: CPT | Performed by: ORTHOPAEDIC SURGERY

## 2022-09-01 PROCEDURE — 1101F PT FALLS ASSESS-DOCD LE1/YR: CPT | Performed by: ORTHOPAEDIC SURGERY

## 2022-09-01 PROCEDURE — 99204 OFFICE O/P NEW MOD 45 MIN: CPT | Performed by: ORTHOPAEDIC SURGERY

## 2022-09-01 PROCEDURE — G8536 NO DOC ELDER MAL SCRN: HCPCS | Performed by: ORTHOPAEDIC SURGERY

## 2022-09-01 PROCEDURE — G8427 DOCREV CUR MEDS BY ELIG CLIN: HCPCS | Performed by: ORTHOPAEDIC SURGERY

## 2022-09-01 NOTE — PATIENT INSTRUCTIONS
Spondylolysis and Spondylolisthesis: Exercises  Introduction  Here are some examples of exercises for you to try. The exercises may be suggested for a condition or for rehabilitation. Start each exercise slowly. Ease off the exercises if you start to have pain. You will be told when to start these exercises and which ones will work best for you. How to do the exercises  Single knee-to-chest    Lie on your back with your knees bent and your feet flat on the floor. You can put a small pillow under your head and neck if it is more comfortable. Bring one knee to your chest, keeping the other foot flat on the floor. Keep your lower back pressed to the floor. Hold for 15 to 30 seconds. Relax, and lower the knee to the starting position. Repeat with the other leg. Repeat 2 to 4 times with each leg. To get more stretch, put your other leg flat on the floor while pulling your knee to your chest.  Double knee-to-chest    Lie on your back with your knees bent and your feet flat on the floor. You can put a small pillow under your head and neck if it is more comfortable. Bring both knees to your chest.  Keep your lower back pressed to the floor. Hold for 15 to 30 seconds. Relax, and lower your knees to the starting position. Repeat 2 to 4 times. Alternate arm and leg (bird dog) exercise    Do this exercise slowly. Try to keep your body straight at all times. Start on the floor, on your hands and knees. Tighten your belly muscles by pulling your belly button in toward your spine. Be sure you continue to breathe normally and do not hold your breath. Raise one arm off the floor, and hold it straight out in front of you. Be careful not to let your shoulder drop down, because that will twist your trunk. Hold for about 6 seconds, then lower your arm and switch to your other arm. Repeat 8 to 12 times on each arm. When you can do this exercise with ease and no pain, repeat steps 1 through 5.  But this time do it with one leg raised off the floor, holding your leg straight out behind you. Be careful not to let your hip drop down, because that will twist your trunk. When holding your leg straight out becomes easier, try raising your opposite arm at the same time, and repeat steps 1 through 5. Bridging    Lie on your back with both knees bent. Your knees should be bent about 90 degrees. Then push your feet into the floor, squeeze your buttocks, and lift your hips off the floor until your shoulders, hips, and knees are all in a straight line. Hold for about 6 seconds as you continue to breathe normally, and then slowly lower your hips back down to the floor and rest for up to 10 seconds. Repeat 8 to 12 times. Curl-ups    Lie on the floor on your back with your knees bent at a 90-degree angle. Your feet should be flat on the floor, about 12 inches from your buttocks. Cross your arms over your chest. If this bothers your neck, try putting your hands behind your neck (not your head), with your elbows spread apart. Slowly tighten your belly muscles and raise your shoulder blades off the floor. Keep your head in line with your body, and do not press your chin to your chest.  Hold this position for 1 or 2 seconds, then slowly lower yourself back down to the floor. Repeat 8 to 12 times. Plank    Do this exercise slowly. Try to keep your body straight at all times, and do not let one hip drop lower than the other. Lie on your stomach, resting your upper body on your forearms. Tighten your belly muscles by pulling your belly button in toward your spine. Keeping your knees on the floor, press down with your forearms to lift your upper body off the floor. Hold for about 6 seconds, then lower your body to the floor. Rest for up to 10 seconds. Repeat 8 to 12 times. Over time, work up to holding for 15 to 30 seconds each time.   If this exercise is easy to do with your knees on the floor, try doing this exercise with your knees and legs straight, supported by your toes on the floor. Follow-up care is a key part of your treatment and safety. Be sure to make and go to all appointments, and call your doctor if you are having problems. It's also a good idea to know your test results and keep a list of the medicines you take. Where can you learn more? Go to http://www.[employer].com/  Enter M245 in the search box to learn more about \"Spondylolysis and Spondylolisthesis: Exercises. \"  Current as of: July 1, 2021               Content Version: 13.2  © 2219-0813 [email or web address]. Care instructions adapted under license by [employer] (which disclaims liability or warranty for this information). If you have questions about a medical condition or this instruction, always ask your healthcare professional. [address] any warranty or liability for your use of this information.

## 2022-09-01 NOTE — LETTER
9/1/2022    Patient: Jeff Mcghee. YOB: 1945   Date of Visit: 9/1/2022     Pieter Villa MD  [address] [address]  Via Fax: [phone]     Liberty Higgins MD  [address] [address]  Via Fax: [phone]    Dear MD Liberty Allen MD,      Thank you for referring Mr. Gisel Valera to [facility] for evaluation. My notes for this consultation are attached. If you have questions, please do not hesitate to call me. I look forward to following your patient along with you.       Sincerely,    Latrice Greenberg MD

## 2022-09-01 NOTE — PROGRESS NOTES
Iban Andrade (: 1945) is a 76 y.o. male, patient, here for evaluation of the following chief complaint(s):  LOW BACK PAIN and Leg Pain       ASSESSMENT/PLAN:    Below is the assessment and plan developed based on review of pertinent history, physical exam, labs, studies, and medications. He has mechanical lower back pain and intermittent radicular symptoms on the right side. His history is significant for a lumbar laminectomy approximately 12 years ago. The pain does sound like neurogenic claudication again. I would like to get a new CT scan to see if he is got new junctional stenosis. In the interim I will start some oral Neurontin at night. We will see him with the test results. 1. DDD (degenerative disc disease), lumbar  -     XR SPINE LUMB MIN 4 V; Future  -     CT SPINE LUMB WO CONT; Future  2. Lumbar degenerative disc disease  3. Spinal stenosis of lumbar region with neurogenic claudication      No follow-ups on file. SUBJECTIVE/OBJECTIVE:  Iban Andrade (: 1945) is a 76 y.o. male. Pain Assessment  2022   Location of Pain Leg;Back   Location Modifiers Posterior; Left   Severity of Pain 4        He comes in today for follow-up. He has a chronic history of lower back pain. History is significant for previous L4-L5 lumbar laminectomy by partner 12 years ago. Recently date he has been having some increasing lower back pain with walking and standing. Also some mild night pain. He does get improvement with rest.  He denies any bowel bladder difficulties    Imaging:    XR Results (most recent):  Results from Appointment encounter on 22    XR SPINE LUMB MIN 4 V    Narrative  AP lateral flexion-extension lumbar spine demonstrates previous laminectomy L4-S1 degenerative lumbar scoliosis is noted. Maintained lumbar lordosis. Spondylosis at multiple levels.   No acute fracture lytic lesion                   MRI Results (most recent):    NOne      Allergies   Allergen Reactions    Hydrocodone Itching       Current Outpatient Medications   Medication Sig    furosemide (LASIX) 20 mg tablet Take 20 mg by mouth two (2) times a day. aspirin delayed-release 81 mg tablet Take 1 Tablet by mouth daily. clopidogreL (PLAVIX) 75 mg tab Take 1 Tablet by mouth daily. acetaminophen (TYLENOL) 500 mg tablet Take 1,000 mg by mouth every six (6) hours as needed for Pain. Indications: pain    traMADol (ULTRAM) 50 mg tablet Take 50 mg by mouth every six (6) hours as needed for Pain. rosuvastatin (CRESTOR) 20 mg tablet Take 20 mg by mouth daily. metoprolol (LOPRESSOR) 50 mg tablet Take  by mouth two (2) times a day. benazepril (LOTENSIN) 20 mg tablet Take 40 mg by mouth daily. OMEPRAZOLE MAGNESIUM (PRILOSEC OTC PO) Take 20 mg by mouth daily. multivitamin, stress formula (STRESS TAB) tablet Take 1 Tab by mouth daily. No current facility-administered medications for this visit. Past Medical History:   Diagnosis Date    A-fib [facility])     Arthritis     CAD (coronary artery disease)     GERD (gastroesophageal reflux disease)     High cholesterol     Hypertension     Other ill-defined conditions(389.89)     pt had mesh removed from his abdomen which was causing an infection, antibiotics were used and recently pt had old abdominal incision from hernia repair which also started to ooze. ..saw primary care md arizmendi and he pulled out an old suture and cultured the drainage and was positive for mrsa    Sleep apnea     does not use CPAP        Past Surgical History:   Procedure Laterality Date    HX GASTRIC BYPASS  1985    HX GI  1985    gastric bypass    HX GI  2004    colon resection    HX GI  2009    mesh removed    HX ORTHOPAEDIC  2000    left torn quad repair    HX ORTHOPAEDIC  2001    rotator cuff rt    HX ORTHOPAEDIC  2007/08     r hip replaced x2    HX ORTHOPAEDIC  2009    r shoulder replaced    HX ORTHOPAEDIC  2010    lumbar laminectomy    HX ORTHOPAEDIC Left 2019    elbow surgery    HX TONSILLECTOMY  1949    [facility]  -    hernia repair x3    [facility] CARDIAC SURG PROCEDURE UNLIST  2008    cabg x6       Family History   Problem Relation Age of Onset    Heart Disease Mother     Hypertension Mother     Diabetes Mother     Cancer Mother     Stroke Mother     Heart Disease Father     Cancer Father         Social History     Tobacco Use    Smoking status: Former     Types: Cigarettes     Quit date:      Years since quittin.7    Smokeless tobacco: Never   Substance Use Topics    Alcohol use: No     Comment: very rare    Drug use: Never        Review of Systems       Vitals:  Ht 5' 7\" (1.702 m)   Wt 180 lb (81.6 kg)   BMI 28.19 kg/m²    Body mass index is 28.19 kg/m². Ortho Exam       Alert and [place]  x 3    Normal gait and station; normal posture    No assistive devices today. Cervical spine:  Examination of the cervical spine demonstrates no tenderness on palpation, no pain, no swelling or edema, with normal lumbar range of motion. Thoracic spine: Examination of the thoracic spine demonstrates no tenderness on palpation, no pain, no swelling or edema. Normal sensation and range of motion. Lumbar spine:     Examination of the lumbar spine demonstrates on inspection: Straightening the lumbar lordosis. Surgical incision is intact. Mild pelvic obliquity mild lumbar scoliosis. On palpation: Generalized tenderness on palpation of buttock and hip region. Range of motion: Normal range of motion is noted. Good flexion is noted.     Motor examination:  Right iliopsoas 5/5,  left iliopsoas 5/5, right quad 5/5, left quad 5/5, right anterior tibialis 5/5, left anterior tibialis 5/5, right EHL 5/5, left EHL 5/5, right gastrocnemius 5/5 , left gastrocnemius 5/5    Sensory examination: Reveals sensory exam is normal    Reflexes: Left knee 2/2, right knee 2/2, right ankle 2/2, left ankle 2/2    Functional testing: Straight leg test negative today; bowstring sign mildly positive on the right    Babinsksi and Clonus negative bilaterally. An electronic signature was used to authenticate this note.   -- Marcelo Connor MD

## 2022-09-01 NOTE — LETTER
CONTROLLED SUBSTANCE MEDICATION AGREEMENT  Patient Name: Jeff Mcghee. Patient YOB: 1945   [id number]  I understand, that controlled substance medications may be used to help better manage my symptoms and to improve my ability to function at home, work and in social settings. However, I also understand that these medications do have risks, which have been discussed with me, including possible development of physical or psychological dependence. I understand that successful treatment requires mutual trust and honesty between me and my provider. I understand and agree that following this Medication Agreement is necessary in continuing my provider-patient relationship and the success of my treatment plan. Explanation from my Provider: Benefits and Goals of Controlled Substance Medications: There are two potential goals for your treatment: (1) decreased pain and suffering (2) improved daily life functions. There are many possible treatments for your chronic condition(s). Alternatives such as physical therapy, yoga, massage, home daily exercise, meditation, relaxation techniques, injections, chiropractic manipulations, surgery, cognitive therapy, hypnosis and many medications that are not habit-forming may be used. Use of controlled substance medications may be helpful, but they are unlikely to resolve all symptoms or restore all function. Explanation from my Provider: Risks of Controlled Substance Medications:   Opioid pain medications: These medications can lead to problems such as addiction/dependence, sedation, lightheadedness/dizziness, memory issues, falls, constipation, nausea, or vomiting. They may also impair the ability to drive or operate machinery. Additionally, these medications may lower testosterone levels, leading to loss of bone strength, stamina and sex drive.   They may cause problems with breathing, sleep apnea and reduced coughing, which is especially dangerous for patients with lung disease. Overdose or dangerous interactions with alcohol and other medications may occur, leading to death. Hyperalgesia may develop, which means patients receiving opioids for the treatment of pain may become more sensitive to certain painful stimuli, and in some cases, experience pain from ordinarily non-painful stimuli. Women between the ages of 14-53 who could become pregnant should carefully weigh the risks and benefits of opioids with their physicians, as these medications increase the risk of pregnancy complications, including miscarriage,  delivery and stillbirth. It is also possible for babies to be born addicted to opioids. Opioid dependence withdrawal symptoms may include; feelings of uneasiness, increased pain, irritability, belly pain, diarrhea, sweats and goose-flesh. Testosterone replacement therapy:  Potential side effects include increased risk of stroke and heart attack, blood clots, increased blood pressure, increased cholesterol, enlarged prostate, sleep apnea, irritability/aggression and other mood disorders, and decreased fertility. Chencho Vasquez. (1945)             Page 1 of 4    Initials:_______    Benzodiazepines and non-benzodiazepine sleep medications: These medications can lead to problems such as addiction/dependence, sedation, fatigue, lightheadedness, dizziness, incoordination, falls, depression, hallucinations, and impaired judgment, memory and concentration. The ability to drive and operate machinery may also be affected. Abnormal sleep-related behaviors have been reported, including sleepwalking, driving, making telephone calls, eating, or having sex while not fully awake. These medications can suppress breathing and worsen sleep apnea, particularly when combined with alcohol or other sedating medications, potentially leading to death.  Dependence withdrawal symptoms may include tremors, anxiety, hallucinations and seizures. Stimulants:  Common adverse effects include addiction/dependence, increased blood pressure and heart rate, decreased appetite, nausea, involuntary weight loss, insomnia,  irritability, and headaches. These risks may increase when these medications are combined with other stimulants, such as caffeine pills or energy drinks, certain weight loss supplements and oral decongestants. Dependence withdrawal symptoms may include depressed mood, loss of interest, suicidal thoughts, anxiety, fatigue, appetite changes and agitation. I agree and understand that I and my prescriber have the following rights and responsibilities regarding my treatment plan:   1. MY RIGHTS:  To be informed of my treatment and medication plan. To be an active participant in my health and wellbeing. 2. MY RESPONSIBILITY AND UNDERSTANDING FOR USE OF MEDICATIONS   I will take medications at the dose and frequency as directed. For my safety, I will not increase or change how I take my medications without the recommendation of my healthcare provider.  I will actively participate in any program recommended by my provider which may improve function, including social, physical, psychological programs.  I will not take my medications with alcohol or other drugs not prescribed to me. I understand that drinking alcohol with my medications increases the chances of side effects, including reduced breathing rate and could lead to personal injury when operating machinery.  I understand that if I have a history of substance use disorders, including alcohol or other illicit drugs, that I may be at increased risk of addiction to my medications.  I agree to notify my provider immediately if I should become pregnant so that my treatment plan can be adjusted.    I agree and understand that I shall only receive controlled substance medications from the prescriber that signed this agreement unless there is written agreement among other prescribers of controlled substances outlining the responsibility of the medications being prescribed.  I understand that the if the controlled medication is not helping to achieve goals, the dosage may be tapered and no longer prescribed. 3. MY RESPONSIBILITY FOR COMMUNICATION / PRESCRIPTION RENEWALS   I agree that all controlled substance medications that I take will be prescribed only by my provider. If another healthcare provider prescribes me medication in an emergency, I will notify my provider within seventy-two (72) hours. James Weir. (1945)             Page 2 of 4    Initials:_______  Megan Benavides I will arrange for refills at the prescribed interval ONLY during regular office hours. I will not ask for refills earlier than agreed, after-hours, on holidays or weekends. Refills may take up to 72 hours for processing and prescriptions to reach the pharmacy.  I will inform my other health care providers that I am taking these medications and of the existence of this [id number]. In the event of an emergency, I will provide the same information to the emergency department prescribers.  I will keep my provider updated on the pharmacy I am using for controlled medication prescription filling. 4. MY RESPONSIBILITY FOR PROTECTING MEDICATIONS   I will protect my prescriptions and medications. I understand that lost or misplaced prescriptions will not be replaced.  I will keep medications only for my own use and will not share them with others. I will keep all medications away from children.  I agree that if my medications are adjusted or discontinued, I will properly dispose of any remaining medications. I understand that I will be required to dispose of any remaining controlled medications as, directed by my prescriber, prior to being provided with any prescriptions for other controlled medications.   Medication drop box locations can be found at: [email or web address]  5. MY RESPONSIBILITY WITH ILLEGAL DRUGS    I will not use illegal or street drugs or another person's prescription medications not prescribed to me.  If there are identified addiction type symptoms, then referral to a program may be provided by my provider and I agree to follow through with this recommendation. 6. MY RESPONSIBILITY FOR COOPERATION WITH INVESTIGATIONS   I understand that my provider will comply with any applicable law and may discuss my use and/or possible misuse/abuse of controlled substances and alcohol, as appropriate, with any health care provider involved in my care, pharmacist, or legal authority.  I authorize my provider and pharmacy to cooperate fully with law enforcement agencies (as permitted by law) in the investigation of any possible misuse, sale, or other diversion of my controlled substances.  I agree to waive any applicable privilege or right of privacy or confidentiality with respect to these authorizations. 7. PROVIDERS RIGHT TO MONITOR FOR SAFETY: PRESCRIPTION MONITORING / DRUG TESTING   I consent to drug/toxicology screening and will submit to a drug screen upon my providers request to assure I am only taking the prescribed drugs for my safety monitoring. I understand that a drug screen is a laboratory test in which a sample of my urine, blood or saliva is checked to see what drugs I have been taking. This may entail an observed urine specimen, which means that a nurse or other health care provider may watch me provide urine, and I will cooperate if I am asked to provide an observed specimen. Geoff Pedraza. (1945)             Page 3 of 4    Initials:_______  Purvi Jade I understand that my provider will check a copy of my State Prescription Monitoring Program () Report in order to safely prescribe medications.      Pill Counts: I consent to pill counts when requested. I may be asked to bring all my prescribed controlled substance medications, in their original bottles, to all of my scheduled appointments. In addition, my provider may ask me to come to the practice at any time for a random pill count. 8. TERMINATION OF THIS AGREEMENT   For my safety, my prescriber has the right to stop prescribing controlled substance medications and may end this agreement.  Conditions that may result in termination of this agreement:  a. I do not show any improvement in pain, or my activity has not improved. b. I develop rapid tolerance or loss of improvement, as described in my treatment plan.  c. I develop significant side effects from the medication. d. My behavior is not consistent with the responsibilities outlined above, thereby causing safety concerns to continue prescribing controlled substance medications. e. I fail to follow the terms of this agreement. f. Other:____________________________     UNDERSTANDING THIS MEDICATION AGREEMENT:    I have read the above and have had all my questions answered. For chronic disease management, I know that my symptoms can be managed with many types of treatments. A chronic medication trial may be part of my treatment, but I must be an active participant in my care. Medication therapy is only one part of my symptom management plan. In some cases, there may be limited scientific evidence to support the chronic use of certain medications to improve symptoms and daily function. Furthermore, in certain circumstances, there may be scientific information that suggests that the use of chronic controlled substances may worsen my symptoms and increase my risk of unintentional death directly related to this medication therapy.   I know that if my provider feels my risk from controlled medications is greater than my benefit, I will have my controlled substance medication(s) compassionately lowered or removed altogether. I further agree to allow this office to contact my HIPAA contact if there are concerns about my safety and use of the controlled medications. I have agreed to use the prescribed controlled substance medications to me as instructed by my provider and as stated in this Medication Agreement. My initial on each page and my signature below shows that I have read each page and I have had the opportunity to ask questions with answers provided by my provider.       Patient Name (Printed): _____________________________________    Patient Signature:  ______________________   Date: _____________      Prescriber Name (Printed): ___________________________________    Prescriber Signature: _____________________  Date: _____________     [facility] (1945[facility]             Page 4 of 4

## 2022-09-27 ENCOUNTER — HOSPITAL ENCOUNTER (OUTPATIENT)
Dept: CT IMAGING | Age: 77
Discharge: HOME | End: 2022-09-27
Attending: ORTHOPAEDIC SURGERY
Payer: MEDICARE

## 2022-09-27 DIAGNOSIS — M51.36 DDD (DEGENERATIVE DISC DISEASE), LUMBAR: ICD-10-CM

## 2022-09-27 PROCEDURE — 72131 CT LUMBAR SPINE W/O DYE: CPT

## 2022-09-28 ENCOUNTER — TELEPHONE (OUTPATIENT)
Dept: NEUROLOGY | Age: 77
End: 2022-09-28

## 2022-09-28 NOTE — TELEPHONE ENCOUNTER
Pt cannot make the appt on 9/29 at 2 with Dr. Bryanna Culver. This was a double booked appt. I am not sure if it needs to be double booked again or just r/s.  Please call [phone]

## 2023-02-08 ENCOUNTER — OFFICE VISIT (OUTPATIENT)
Dept: NEUROLOGY | Age: 78
End: 2023-02-08
Payer: MEDICARE

## 2023-02-08 VITALS
BODY MASS INDEX: 30.57 KG/M2 | RESPIRATION RATE: 18 BRPM | HEIGHT: 67 IN | OXYGEN SATURATION: 93 % | TEMPERATURE: 98.2 F | HEART RATE: 63 BPM | WEIGHT: 194.8 LBS | SYSTOLIC BLOOD PRESSURE: 120 MMHG | DIASTOLIC BLOOD PRESSURE: 72 MMHG

## 2023-02-08 DIAGNOSIS — R26.9 GAIT DIFFICULTY: Primary | ICD-10-CM

## 2023-02-08 PROCEDURE — G8536 NO DOC ELDER MAL SCRN: HCPCS | Performed by: INTERNAL MEDICINE

## 2023-02-08 PROCEDURE — 99205 OFFICE O/P NEW HI 60 MIN: CPT | Performed by: INTERNAL MEDICINE

## 2023-02-08 PROCEDURE — G8417 CALC BMI ABV UP PARAM F/U: HCPCS | Performed by: INTERNAL MEDICINE

## 2023-02-08 PROCEDURE — 3074F SYST BP LT 130 MM HG: CPT | Performed by: INTERNAL MEDICINE

## 2023-02-08 PROCEDURE — G8428 CUR MEDS NOT DOCUMENT: HCPCS | Performed by: INTERNAL MEDICINE

## 2023-02-08 PROCEDURE — 3078F DIAST BP <80 MM HG: CPT | Performed by: INTERNAL MEDICINE

## 2023-02-08 PROCEDURE — 1101F PT FALLS ASSESS-DOCD LE1/YR: CPT | Performed by: INTERNAL MEDICINE

## 2023-02-08 PROCEDURE — G8432 DEP SCR NOT DOC, RNG: HCPCS | Performed by: INTERNAL MEDICINE

## 2023-02-08 PROCEDURE — 1123F ACP DISCUSS/DSCN MKR DOCD: CPT | Performed by: INTERNAL MEDICINE

## 2023-02-08 RX ORDER — BUMETANIDE 1 MG/1
1 TABLET ORAL 2 TIMES DAILY
COMMUNITY
Start: 2023-01-19

## 2023-02-08 RX ORDER — SERTRALINE HYDROCHLORIDE 50 MG/1
50 TABLET, FILM COATED ORAL DAILY
COMMUNITY

## 2023-02-08 NOTE — PROGRESS NOTES
Neurology Note    Patient ID:  Torsten Conner  [id number]  [de-identified] y.o.  1945      Date of Consultation:  February 8, 2023      Assessment and Plan:  39-year-old male with numbness in the big toe bilaterally as well as low back pain and cold feet at night. Patient's symptoms and exam as well as neuroimaging are suggestive of 2 overlying factors that are responsible for his symptoms which include likely neuropathy from prediabetes and other vascular risk factors as well as L5 radiculopathy. CT of the lumbar spine shows moderate to severe bilateral foraminal stenosis at L4-S1 as well as significant retrolisthesis. Spinal canal and cord are intact without any stenosis. Patient does have an appointment with orthopedics for further intervention such as possible injections versus surgery. Based on his exam and other characteristic features I would not recommend surgery at this time however I would agree that possible injections would be helpful for his low back pain. He would need to continue to follow-up with his PCP regarding risk factors for neuropathy. He has had TSH checked and he is taking B12 supplements. Hemoglobin A1c is 6.4% right at the cusp of becoming a diabetic. His exam is consistent with a length dependent peripheral neuropathy, likely due to prediabetes. Given the typical features, I would not recommend an EMG/NCS at this time. The neuropathy in his feet can certainly cause balance issues as well. Recommendations:  - Follow-up with orthopedics regarding further intervention such as steroid injections or Toradol injections to the low back  - Reviewed CT lumbar spine with the patient  - Defer EMG/NCS at this time  - Continue B12 supplements  - Recommend aerobic exercise, compression stockings, Mediterranean diet  - Recommend referral to physical therapy for gait strengthening      Problem was discussed at length with the patient. We reviewed the results of the study in detail.   We also discussed prognosis and treatment options. The patient had opportunity today to ask all questions, expressed understanding of the instructions provided, and agreed with the plan of treatment. Follow up in 6 to 8 months. I spent 60 minutes providing care to this patient, reviewing the patient's chart, notes, labs, medications and preparing documentation along with >50% of the time spent counseling patient during the encounter. History of Present Illness:   Justen Ritchie is a [de-identified] y.o. male with history of atrial fibrillation, CAD, hyperlipidemia, hypertension, sleep apnea,, lumbar L3-L5 laminectomies, prediabetes, presenting as a new patient for numbness in his toes bilaterally as well as low back pain and cold feet. He states that over the course of the last several months to approximately 1 year he has experienced low back pain which is always been a chronic issue with numbness of his big toes bilaterally. He denies any numbness or tingling in his feet or any burning sensation. He denies any focal weakness or significant weakness in his legs. He does feel that at times he is a little bit slower and doing activities and has to be more careful with his footing primarily with walking as he does feel like he is a little bit off balance. He does not require any assistive devices and has not had any falls. He does find himself when he is walking having to look down on the ground to make sure that he does not fall. He also mentions that at night he experiences that his feet are very cold. When he is sitting up in the recliner he does not have the same symptoms. When he is at a grocery store he will have to bend over and lean on the cart in order to get some relief from his back. He denies any history of chemo or radiation. He has not started any new medications. He denies any history of smoking or alcohol use or drug use.   He denies being exposed to any type of metals or toxins. He denies any significant weight loss or weight gain that was unintentional.  Denies any rash, dry eye or dry mouth of significance. He also denies any B6 or zinc supplementation. TSH was within normal limits however his hemoglobin A1c was on the border between prediabetes and diabetes at 6.4%. B12 was 873 he is on B12 supplements. Past Medical History:   Diagnosis Date    A-fib [facility])     Arthritis     CAD (coronary artery disease)     GERD (gastroesophageal reflux disease)     High cholesterol     Hypertension     Other ill-defined conditions(799.89)     pt had mesh removed from his abdomen which was causing an infection, antibiotics were used and recently pt had old abdominal incision from hernia repair which also started to ooze. ..saw primary care md arizmendi and he pulled out an old suture and cultured the drainage and was positive for mrsa    Sleep apnea     does not use CPAP        Past Surgical History:   Procedure Laterality Date    HX GASTRIC BYPASS      HX GI      gastric bypass    HX GI      colon resection    HX GI  2009    mesh removed    HX ORTHOPAEDIC  2000    left torn quad repair    HX ORTHOPAEDIC      rotator cuff rt    HX ORTHOPAEDIC  /     r hip replaced x2    HX ORTHOPAEDIC  2009    r shoulder replaced    HX ORTHOPAEDIC  2010    lumbar laminectomy    HX ORTHOPAEDIC Left 2019    elbow surgery    HX TONSILLECTOMY  1949    [facility] UNLISTED PROCEDURE ABDOMEN PERITONEUM & OMENTUM  -    hernia repair x3    [facility] UNLISTED PROCEDURE CARDIAC SURGERY  2008    cabg x6        Family History   Problem Relation Age of Onset    Heart Disease Mother     Hypertension Mother     Diabetes Mother     Cancer Mother     Stroke Mother     Heart Disease Father     Cancer Father         Social History     Tobacco Use    Smoking status: Former     Types: Cigarettes     Quit date:      Years since quittin.1    Smokeless tobacco: Never   Substance Use Topics    Alcohol use:  No Comment: very rare        Allergies   Allergen Reactions    Hydrocodone Itching        Prior to Admission medications    Medication Sig Start Date End Date Taking? Authorizing Provider   bumetanide (BUMEX) 1 mg tablet Take 1 mg by mouth two (2) times a day. 1/19/23  Yes Provider, Historical   sertraline (ZOLOFT) 50 mg tablet Take 50 mg by mouth daily. Yes Provider, Historical   aspirin delayed-release 81 mg tablet Take 1 Tablet by mouth daily. 1/10/22  Yes Skyler Potts MD   acetaminophen (TYLENOL) 500 mg tablet Take 1,000 mg by mouth every six (6) hours as needed for Pain. Indications: pain   Yes Provider, Historical   traMADol (ULTRAM) 50 mg tablet Take 50 mg by mouth every six (6) hours as needed for Pain. Yes Provider, Historical   rosuvastatin (CRESTOR) 20 mg tablet Take 20 mg by mouth daily. Yes Provider, Historical   metoprolol (LOPRESSOR) 50 mg tablet Take  by mouth two (2) times a day. Yes Provider, Historical   benazepril (LOTENSIN) 20 mg tablet Take 40 mg by mouth daily. Yes Provider, Historical   OMEPRAZOLE MAGNESIUM (PRILOSEC OTC PO) Take 20 mg by mouth daily. Yes Provider, Historical   multivitamin, stress formula (STRESS TAB) tablet Take 1 Tab by mouth daily. Yes Provider, Historical   furosemide (LASIX) 20 mg tablet Take 20 mg by mouth two (2) times a day. Patient not taking: Reported on 2/8/2023    Provider, Historical   clopidogreL (PLAVIX) 75 mg tab Take 1 Tablet by mouth daily.   Patient not taking: Reported on 2/8/2023 1/10/22   Skyler Potts MD       Review of Systems:    General, constitutional: negative  Eyes, vision: negative  Ears, nose, throat: negative  Cardiovascular, heart: negative  Respiratory: negative  Gastrointestinal: negative  Genitourinary: negative  Musculoskeletal: negative  Skin and integumentary: negative  Psychiatric: negative  Endocrine: negative  Neurological: negative, except for HPI  Hematologic/lymphatic: negative  Allergy/immunology: negative    []Unable to obtain  ROS due to  []mental status change  []sedated   []intubated    Objective:     Visit Vitals  /72 (BP 1 Location: Left upper arm, BP Patient Position: Sitting, BP Cuff Size: Large adult)   Pulse 63   Temp 98.2 °F (36.8 °C) (Temporal)   Resp 18   Ht 5' 7\" (1.702 m)   Wt 194 lb 12.8 oz (88.4 kg)   SpO2 93%   BMI 30.51 kg/m²       Physical Exam:  General:  appears well nourished in no acute distress  Neck: no obvious deformity or masses  Lungs: comfortable on room air  Heart:  well-perfused   Lower extremity: no edema  Skin: intact    Neurological exam:  Awake, alert, oriented to person, place and time  Recent and remote memory were normal  Attention and concentration were intact  Language was intact. There was no aphasia  Speech: no dysarthria  Fund of knowledge was preserved    Cranial nerves:   II-XII were tested    Right pupil traumatic does not react to light  Left pupil reacts to light briskly  Visual fields were full to finger counting   EOMI, no evidence of nystagmus  Facial sensation:  normal and symmetric  Facial motor: normal and symmetric  Hearing intact  SCM strength intact  Tongue: midline without fasciculations    Motor: Tone normal in upper and lower extremities     Strength testing:   deltoid triceps biceps Wrist ext. Wrist flex. intrinsics Hip flex. Hip ext. Knee ext.   Knee flex Dorsi flex Plantar flex   Right 5 5 5 5 5 5 5 NT 5 5 5 5   Left 5 5 5 5 5 5 5 NT 5 5 5 5   Toe extensors R 5/5 L 5/5   Inversion and eversion intact internal rotation and external rotation of the foot intact 5 out of 5 bilaterally    Sensory:  Upper extremity: intact to pp, light touch, and vibration > 10 seconds, and proprioception  Lower extremity: Diminished to pinprick distal to the mid shin bilaterally, intact to light touch, diminished to vibration sense in the big toe and medial malleolus compared with the knee.    Reflexes:    Right Left  Biceps  2 2  Triceps  2 2  Brachiorad. 2 2  Patella  1 Absent TKR  Achilles absent bilaterally     Plantar response:  flexor bilaterally    Cerebellar testing:  no tremor apparent, finger/nose and rapid alternating movements were intact    Gait: steady. Labs:     Lab Results   Component Value Date/Time    Hemoglobin A1c 6.4 (H) 11/18/2010 09:21 AM    Sodium 139 11/10/2021 11:14 AM    Potassium 4.0 11/10/2021 11:14 AM    Chloride 104 11/10/2021 11:14 AM    Glucose 135 (H) 11/10/2021 11:14 AM    BUN 15 11/10/2021 11:14 AM    Creatinine 1.03 11/10/2021 11:14 AM    Calcium 9.0 11/10/2021 11:14 AM    WBC 7.1 11/10/2021 11:14 AM    HCT 34.3 (L) 11/10/2021 11:14 AM    HGB 11.1 (L) 11/10/2021 11:14 AM    PLATELET 370 05/07/1143 11:14 AM       Imaging:    No results found for this or any previous visit. Results from [place] encounter on 09/27/22    CT SPINE LUMB WO CONT    Narrative  INDICATION:  Dx: DDD (degenerative disc disease), lumbar [M51.36 (ICD-10-CM)]    COMPARISON: Lumbosacral spine radiographs 9/1/2022, CT myelogram thoracic and  lumbar spine 11/18/2010. TECHNIQUE: Helical CT imaging of the lumbar spine. Coronal and sagittal  reformats. CT dose reduction was achieved through the use of a standardized  protocol tailored for this examination and automatic exposure control for dose  modulation. CONTRAST: None    FINDINGS:  L3-L5 laminectomies. Stepwise retrolisthesis from L1-L5. Moderate levoscoliosis. There is no fracture  or compression deformity. Vertebral body ankle lordosis from L1-L3. Multilevel  degenerative endplate osteophytes. Atherosclerosis. Suture material along the greater curvature of the stomach. Suture material in the sigmoid colon. 5 mm nonobstructing left upper pole renal  calculus. Incompletely evaluated bilateral hip arthroplasties. Eccentric liner wear noted  on the RIGHT.     Lower thoracic spine: No herniation or stenosis. L1-2: Retrolisthesis. Moderate facet arthropathy. No spinal stenosis. Moderate  right foraminal stenosis. L2-3: Retrolisthesis. Marked facet arthropathy. No spinal stenosis. Mild right  foraminal stenosis. L3-4: Retrolisthesis. Disc bulge. Endplate osteophytes. Marked facet  arthropathy. No spinal stenosis. Severe right and moderate left foraminal  stenosis. L4-5: Retrolisthesis. Disc bulge. Endplate osteophytes. Marked facet  arthropathy. No spinal stenosis. Moderate bilateral foraminal stenosis. L5-S1: Disc bulge. Marked facet arthropathy. No spinal stenosis. Moderate-severe  bilateral foraminal stenosis. Impression  1. L3-L5 laminectomies. Multilevel degenerative changes, disc disease, and  listhesis on a background of moderate levoscoliosis. No significant spinal canal  stenosis. Multilevel varying degrees of foraminal stenosis as outlined above. 2.  Incompletely evaluated bilateral hip arthroplasties. Eccentric liner wear  noted on the RIGHT. 3.  Nonobstructing left upper pole renal calculus.              Patient Active Problem List   Diagnosis Code    Lumbar spinal stenosis M48.061    HTN (hypertension) I10    Diabetes ([facility] [address] E11.9    Recurrent incisional hernia K43.2    Presence of Watchman left atrial appendage closure device Z95.818                   Signed By:   Laury Martinez DO  Neurophysiology      February 8, 2023

## 2023-02-08 NOTE — PROGRESS NOTES
Chief Complaint   Patient presents with    New Patient     Referred by pcp for neuropathy in feet - happens only when in bed - has been going on for a year - feels like feet are in ice water but warm to the touch - great toes are numb      1. Have you been to the ER, urgent care clinic since your last visit? Hospitalized since your last visit? No     2. Have you seen or consulted any other health care providers outside of the [address] [place] [address] since your last visit? Include any pap smears or colon screening.   No

## 2023-03-02 ENCOUNTER — OFFICE VISIT (OUTPATIENT)
Dept: ORTHOPEDIC SURGERY | Age: 78
End: 2023-03-02

## 2023-03-02 VITALS — BODY MASS INDEX: 30.45 KG/M2 | WEIGHT: 194 LBS | HEIGHT: 67 IN

## 2023-03-02 DIAGNOSIS — M51.36 DDD (DEGENERATIVE DISC DISEASE), LUMBAR: ICD-10-CM

## 2023-03-02 DIAGNOSIS — M51.36 LUMBAR DEGENERATIVE DISC DISEASE: ICD-10-CM

## 2023-03-02 DIAGNOSIS — M48.062 SPINAL STENOSIS OF LUMBAR REGION WITH NEUROGENIC CLAUDICATION: Primary | ICD-10-CM

## 2023-03-02 NOTE — PROGRESS NOTES
Bhavik Hanks (: 1945) is a [de-identified] y.o. male, patient, here for evaluation of the following chief complaint(s):  LOW BACK PAIN       ASSESSMENT/PLAN:    Below is the assessment and plan developed based on review of pertinent history, physical exam, labs, studies, and medications. He has mechanical lower back pain and intermittent radicular symptoms on the right side. His history is significant for a lumbar laminectomy approximately 12 years ago. We reviewed his CT scan. He has developed moderate junctional stenosis at L2-3 and L3-4. It is not too severe. We obviously would like to avoid surgical intervention. I like to order some steroid injections here at the hospital.  We will see his back in 4 to 6 weeks    1. Spinal stenosis of lumbar region with neurogenic claudication  -     IR INJ SPINE THER SUBST LUM/SAC W IMG; Future  2. DDD (degenerative disc disease), lumbar  -     IR INJ SPINE THER SUBST LUM/SAC W IMG; Future  3. Lumbar degenerative disc disease  -     IR INJ SPINE THER SUBST LUM/SAC W IMG; Future      No follow-ups on file. SUBJECTIVE/OBJECTIVE:  Bhavik Hanks (: 1945) is a [de-identified] y.o. male. Pain Assessment  3/2/2023   Location of Pain Leg   Location Modifiers Posterior   Severity of Pain 5   Quality of Pain Aching        He comes in today for follow-up. He has a chronic history of lower back pain. History is significant for previous L4-L5 lumbar laminectomy by partner 12 years ago. Recently date he has been having some increasing lower back pain with walking and standing. Also some mild night pain. He does get improvement with rest.  He denies any bowel bladder difficulties  He is here for follow-up of his CT scan. No acute changes are noted.       Imaging:    XR Results (most recent):  Results from Appointment encounter on 22    XR SPINE LUMB MIN 4 V    Narrative  AP lateral flexion-extension lumbar spine demonstrates previous laminectomy L4-S1 degenerative lumbar scoliosis is noted. Maintained lumbar lordosis. Spondylosis at multiple levels. No acute fracture lytic lesion       CT Results (most recent):  Results from Hospital Encounter encounter on 09/27/22    CT SPINE LUMB WO CONT    Narrative  INDICATION:  Dx: DDD (degenerative disc disease), lumbar [M51.36 (ICD-10-CM)]    COMPARISON: Lumbosacral spine radiographs 9/1/2022, CT myelogram thoracic and  lumbar spine 11/18/2010. TECHNIQUE: Helical CT imaging of the lumbar spine. Coronal and sagittal  reformats. CT dose reduction was achieved through the use of a standardized  protocol tailored for this examination and automatic exposure control for dose  modulation. CONTRAST: None    FINDINGS:  L3-L5 laminectomies. Stepwise retrolisthesis from L1-L5. Moderate levoscoliosis. There is no fracture  or compression deformity. Vertebral body ankle lordosis from L1-L3. Multilevel  degenerative endplate osteophytes. Atherosclerosis. Suture material along the greater curvature of the stomach. Suture material in the sigmoid colon. 5 mm nonobstructing left upper pole renal  calculus. Incompletely evaluated bilateral hip arthroplasties. Eccentric liner wear noted  on the RIGHT. Lower thoracic spine: No herniation or stenosis. L1-2: Retrolisthesis. Moderate facet arthropathy. No spinal stenosis. Moderate  right foraminal stenosis. L2-3: Retrolisthesis. Marked facet arthropathy. No spinal stenosis. Mild right  foraminal stenosis. L3-4: Retrolisthesis. Disc bulge. Endplate osteophytes. Marked facet  arthropathy. No spinal stenosis. Severe right and moderate left foraminal  stenosis. L4-5: Retrolisthesis. Disc bulge. Endplate osteophytes. Marked facet  arthropathy. No spinal stenosis. Moderate bilateral foraminal stenosis. L5-S1: Disc bulge. Marked facet arthropathy. No spinal stenosis. Moderate-severe  bilateral foraminal stenosis. Impression  1. L3-L5 laminectomies. Multilevel degenerative changes, disc disease, and  listhesis on a background of moderate levoscoliosis. No significant spinal canal  stenosis. Multilevel varying degrees of foraminal stenosis as outlined above. 2.  Incompletely evaluated bilateral hip arthroplasties. Eccentric liner wear  noted on the RIGHT. 3.  Nonobstructing left upper pole renal calculus. NOne      Allergies   Allergen Reactions    Hydrocodone Itching       Current Outpatient Medications   Medication Sig    bumetanide (BUMEX) 1 mg tablet Take 1 mg by mouth two (2) times a day. sertraline (ZOLOFT) 50 mg tablet Take 50 mg by mouth daily. furosemide (LASIX) 20 mg tablet Take 20 mg by mouth two (2) times a day. aspirin delayed-release 81 mg tablet Take 1 Tablet by mouth daily. clopidogreL (PLAVIX) 75 mg tab Take 1 Tablet by mouth daily. acetaminophen (TYLENOL) 500 mg tablet Take 1,000 mg by mouth every six (6) hours as needed for Pain. Indications: pain    traMADol (ULTRAM) 50 mg tablet Take 50 mg by mouth every six (6) hours as needed for Pain. rosuvastatin (CRESTOR) 20 mg tablet Take 20 mg by mouth daily. metoprolol (LOPRESSOR) 50 mg tablet Take  by mouth two (2) times a day. benazepril (LOTENSIN) 20 mg tablet Take 40 mg by mouth daily. OMEPRAZOLE MAGNESIUM (PRILOSEC OTC PO) Take 20 mg by mouth daily. multivitamin, stress formula (STRESS TAB) tablet Take 1 Tab by mouth daily. No current facility-administered medications for this visit. Past Medical History:   Diagnosis Date    A-fib [facility])     Arthritis     CAD (coronary artery disease)     GERD (gastroesophageal reflux disease)     High cholesterol     Hypertension     Other ill-defined conditions(072.89)     pt had mesh removed from his abdomen which was causing an infection, antibiotics were used and recently pt had old abdominal incision from hernia repair which also started to ooze. ..saw primary care md arizmendi and he pulled out an old suture and cultured the drainage and was positive for mrsa    Sleep apnea     does not use CPAP        Past Surgical History:   Procedure Laterality Date    HX GASTRIC BYPASS  1985    HX GI  1985    gastric bypass    HX GI  2004    colon resection    HX GI  2009    mesh removed    HX ORTHOPAEDIC  2000    left torn quad repair    HX ORTHOPAEDIC      rotator cuff rt    HX ORTHOPAEDIC       r hip replaced x2    HX ORTHOPAEDIC  2009    r shoulder replaced    HX ORTHOPAEDIC  2010    lumbar laminectomy    HX ORTHOPAEDIC Left 2019    elbow surgery    HX TONSILLECTOMY  1949    [facility] UNLISTED PROCEDURE ABDOMEN PERITONEUM & OMENTUM  -    hernia repair x3    [facility] UNLISTED PROCEDURE CARDIAC SURGERY  2008    cabg x6       Family History   Problem Relation Age of Onset    Heart Disease Mother     Hypertension Mother     Diabetes Mother     Cancer Mother     Stroke Mother     Heart Disease Father     Cancer Father         Social History     Tobacco Use    Smoking status: Former     Types: Cigarettes     Quit date:      Years since quittin.2    Smokeless tobacco: Never   Substance Use Topics    Alcohol use: No     Comment: very rare    Drug use: Never        Review of Systems   All other systems reviewed and are negative. Vitals:  Ht 5' 7\" (1.702 m)   Wt 194 lb (88 kg)   BMI 30.38 kg/m²    Body mass index is 30.38 kg/m². Ortho Exam       Alert and [place]  x 3    Normal gait and station; normal posture    No assistive devices today. Cervical spine:  Examination of the cervical spine demonstrates no tenderness on palpation, no pain, no swelling or edema, with normal lumbar range of motion. Thoracic spine: Examination of the thoracic spine demonstrates no tenderness on palpation, no pain, no swelling or edema. Normal sensation and range of motion. Lumbar spine:     Examination of the lumbar spine demonstrates on inspection: Straightening the lumbar lordosis. Surgical incision is intact.   Mild pelvic obliquity mild lumbar scoliosis. On palpation: Generalized tenderness on palpation of buttock and hip region. Range of motion: Normal range of motion is noted. Good flexion is noted. Motor examination:  Right iliopsoas 5/5,  left iliopsoas 5/5, right quad 5/5, left quad 5/5, right anterior tibialis 5/5, left anterior tibialis 5/5, right EHL 5/5, left EHL 5/5, right gastrocnemius 5/5 , left gastrocnemius 5/5    Sensory examination: Reveals sensory exam is normal    Reflexes: Left knee 2/2, right knee 2/2, right ankle 2/2, left ankle 2/2    Functional testing: Straight leg test negative today; bowstring sign mildly positive on the right    Babinsksi and Clonus negative bilaterally. An electronic signature was used to authenticate this note.   -- Boby Basurto MD

## 2023-03-02 NOTE — PATIENT INSTRUCTIONS
Spondylolysis and Spondylolisthesis: Exercises  Introduction  Here are some examples of exercises for you to try. The exercises may be suggested for a condition or for rehabilitation. Start each exercise slowly. Ease off the exercises if you start to have pain. You will be told when to start these exercises and which ones will work best for you. How to do the exercises  Single knee-to-chest  Lie on your back with your knees bent and your feet flat on the floor. You can put a small pillow under your head and neck if it is more comfortable. Bring one knee to your chest, keeping the other foot flat on the floor. Keep your lower back pressed to the floor. Hold for 15 to 30 seconds. Relax, and lower the knee to the starting position. Repeat with the other leg. Repeat 2 to 4 times with each leg. To get more stretch, put your other leg flat on the floor while pulling your knee to your chest.  Double knee-to-chest  Lie on your back with your knees bent and your feet flat on the floor. You can put a small pillow under your head and neck if it is more comfortable. Bring both knees to your chest.  Keep your lower back pressed to the floor. Hold for 15 to 30 seconds. Relax, and lower your knees to the starting position. Repeat 2 to 4 times. Alternate arm and leg (bird dog) exercise  Do this exercise slowly. Try to keep your body straight at all times. Start on the floor, on your hands and knees. Tighten your belly muscles by pulling your belly button in toward your spine. Be sure you continue to breathe normally and do not hold your breath. Raise one arm off the floor, and hold it straight out in front of you. Be careful not to let your shoulder drop down, because that will twist your trunk. Hold for about 6 seconds, then lower your arm and switch to your other arm. Repeat 8 to 12 times on each arm. When you can do this exercise with ease and no pain, repeat steps 1 through 5.  But this time do it with one leg raised off the floor, holding your leg straight out behind you. Be careful not to let your hip drop down, because that will twist your trunk. When holding your leg straight out becomes easier, try raising your opposite arm at the same time, and repeat steps 1 through 5. Bridging  Lie on your back with both knees bent. Your knees should be bent about 90 degrees. Then push your feet into the floor, squeeze your buttocks, and lift your hips off the floor until your shoulders, hips, and knees are all in a straight line. Hold for about 6 seconds as you continue to breathe normally, and then slowly lower your hips back down to the floor and rest for up to 10 seconds. Repeat 8 to 12 times. Curl-ups  Lie on the floor on your back with your knees bent at a 90-degree angle. Your feet should be flat on the floor, about 12 inches from your buttocks. Cross your arms over your chest. If this bothers your neck, try putting your hands behind your neck (not your head), with your elbows spread apart. Slowly tighten your belly muscles and raise your shoulder blades off the floor. Keep your head in line with your body, and do not press your chin to your chest.  Hold this position for 1 or 2 seconds, then slowly lower yourself back down to the floor. Repeat 8 to 12 times. Plank  Do this exercise slowly. Try to keep your body straight at all times, and do not let one hip drop lower than the other. Lie on your stomach, resting your upper body on your forearms. Tighten your belly muscles by pulling your belly button in toward your spine. Keeping your knees on the floor, press down with your forearms to lift your upper body off the floor. Hold for about 6 seconds, then lower your body to the floor. Rest for up to 10 seconds. Repeat 8 to 12 times. Over time, work up to holding for 15 to 30 seconds each time.   If this exercise is easy to do with your knees on the floor, try doing this exercise with your knees and legs straight, supported by your toes on the floor. Follow-up care is a key part of your treatment and safety. Be sure to make and go to all appointments, and call your doctor if you are having problems. It's also a good idea to know your test results and keep a list of the medicines you take. Current as of: March 9, 2022               Content Version: 13.4  © 2006-2022 [employer], [employer]. Care instructions adapted under license by [facility] (which disclaims liability or warranty for this information). If you have questions about a medical condition or this instruction, always ask your healthcare professional. [place] [address] any warranty or liability for your use of this information.

## 2023-04-06 ENCOUNTER — HOSPITAL ENCOUNTER (OUTPATIENT)
Dept: INTERVENTIONAL RADIOLOGY/VASCULAR | Age: 78
End: 2023-04-06
Attending: ORTHOPAEDIC SURGERY
Payer: MEDICARE

## 2023-04-06 PROCEDURE — 64484 NJX AA&/STRD TFRM EPI L/S EA: CPT

## 2023-04-06 PROCEDURE — 62323 NJX INTERLAMINAR LMBR/SAC: CPT

## 2023-04-06 PROCEDURE — 74011000636 HC RX REV CODE- 636: Performed by: STUDENT IN AN ORGANIZED HEALTH CARE EDUCATION/TRAINING PROGRAM

## 2023-04-06 PROCEDURE — 74011000250 HC RX REV CODE- 250: Performed by: STUDENT IN AN ORGANIZED HEALTH CARE EDUCATION/TRAINING PROGRAM

## 2023-04-06 PROCEDURE — 2709999900 HC NON-CHARGEABLE SUPPLY

## 2023-04-06 PROCEDURE — 74011250636 HC RX REV CODE- 250/636: Performed by: STUDENT IN AN ORGANIZED HEALTH CARE EDUCATION/TRAINING PROGRAM

## 2023-04-06 PROCEDURE — 77030003666 HC NDL SPINAL BD -A

## 2023-04-06 RX ADMIN — LIDOCAINE HYDROCHLORIDE 6 ML: 20 INJECTION, SOLUTION INFILTRATION; PERINEURAL at 13:35

## 2023-04-06 RX ADMIN — DEXAMETHASONE SODIUM PHOSPHATE 15 MG: 10 INJECTION, SOLUTION INTRAMUSCULAR; INTRAVENOUS at 13:35

## 2023-04-06 RX ADMIN — IOPAMIDOL 2 ML: 408 INJECTION, SOLUTION INTRATHECAL at 13:35

## 2023-04-06 RX ADMIN — LIDOCAINE HYDROCHLORIDE 4 ML: 10 INJECTION, SOLUTION EPIDURAL; INFILTRATION; INTRACAUDAL; PERINEURAL at 13:00

## 2023-04-06 NOTE — DISCHARGE INSTRUCTIONS
[address] [facility]  Special Procedures/Radiology Department      Steroidal Injection      Go home and rest.     No vigorous physical activity today. Be aware that numbness and/or tingling can occur up to 24 hours after the injection. No driving today. Resume your previous diet. Resume your previous medications. Depending on your job, you may return to work in 25 to 48 hours. It may take up to one week after the injection to see a change or an improvement in your symptoms. Be sure to follow up with your physician. Tell your physician if the injection helped with your symptoms or if the injection did nothing for your symptoms. For minor discomfort, you can take Tylenol, as directed on the label.       If you have any questions or concerns, please call [phone] and ask for the nurse on-call    Other:

## 2023-04-06 NOTE — PROGRESS NOTES
1311: Patient arrived in [facility] recovery area A&Ox4, on RA, and in NAD at this time. 1350: Discharge instructions reviewed with patient. Patient verbalized understanding of instructions. Patient wheeled to meet wife in waiting area. Escorted to main entrance of [facility].

## 2024-01-08 ENCOUNTER — HOSPITAL ENCOUNTER (OUTPATIENT)
Facility: HOSPITAL | Age: 79
Discharge: HOME | End: 2024-01-11
Attending: ORTHOPAEDIC SURGERY
Payer: MEDICARE

## 2024-01-08 VITALS
TEMPERATURE: 97.8 F | RESPIRATION RATE: 22 BRPM | BODY MASS INDEX: 29.73 KG/M2 | DIASTOLIC BLOOD PRESSURE: 69 MMHG | HEART RATE: 57 BPM | WEIGHT: 185 LBS | OXYGEN SATURATION: 91 % | HEIGHT: 66 IN | SYSTOLIC BLOOD PRESSURE: 161 MMHG

## 2024-01-08 DIAGNOSIS — M48.062 SPINAL STENOSIS, LUMBAR REGION WITH NEUROGENIC CLAUDICATION: ICD-10-CM

## 2024-01-08 DIAGNOSIS — M51.36 OTHER INTERVERTEBRAL DISC DEGENERATION, LUMBAR REGION: ICD-10-CM

## 2024-01-08 PROCEDURE — 64484 NJX AA&/STRD TFRM EPI L/S EA: CPT

## 2024-01-08 PROCEDURE — 2500000003 HC RX 250 WO HCPCS: Performed by: STUDENT IN AN ORGANIZED HEALTH CARE EDUCATION/TRAINING PROGRAM

## 2024-01-08 PROCEDURE — 6360000004 HC RX CONTRAST MEDICATION: Performed by: STUDENT IN AN ORGANIZED HEALTH CARE EDUCATION/TRAINING PROGRAM

## 2024-01-08 PROCEDURE — 6360000002 HC RX W HCPCS: Performed by: STUDENT IN AN ORGANIZED HEALTH CARE EDUCATION/TRAINING PROGRAM

## 2024-01-08 RX ORDER — LIDOCAINE HYDROCHLORIDE 10 MG/ML
4 INJECTION, SOLUTION EPIDURAL; INFILTRATION; INTRACAUDAL; PERINEURAL ONCE
Status: COMPLETED | OUTPATIENT
Start: 2024-01-08 | End: 2024-01-08

## 2024-01-08 RX ORDER — IOPAMIDOL 408 MG/ML
2 INJECTION, SOLUTION INTRATHECAL ONCE
Status: COMPLETED | OUTPATIENT
Start: 2024-01-08 | End: 2024-01-08

## 2024-01-08 RX ORDER — DEXAMETHASONE SODIUM PHOSPHATE 10 MG/ML
15 INJECTION, SOLUTION INTRAMUSCULAR; INTRAVENOUS ONCE
Status: COMPLETED | OUTPATIENT
Start: 2024-01-08 | End: 2024-01-08

## 2024-01-08 RX ORDER — LIDOCAINE HYDROCHLORIDE 20 MG/ML
20 INJECTION, SOLUTION INFILTRATION; PERINEURAL ONCE
Status: COMPLETED | OUTPATIENT
Start: 2024-01-08 | End: 2024-01-08

## 2024-01-08 RX ADMIN — LIDOCAINE HYDROCHLORIDE 4 ML: 10 INJECTION, SOLUTION EPIDURAL; INFILTRATION; INTRACAUDAL; PERINEURAL at 14:35

## 2024-01-08 RX ADMIN — IOPAMIDOL 4 ML: 408 INJECTION, SOLUTION INTRATHECAL at 14:35

## 2024-01-08 RX ADMIN — DEXAMETHASONE SODIUM PHOSPHATE 15 MG: 10 INJECTION, SOLUTION INTRAMUSCULAR; INTRAVENOUS at 14:34

## 2024-01-08 RX ADMIN — LIDOCAINE HYDROCHLORIDE 10 ML: 20 INJECTION, SOLUTION INFILTRATION; PERINEURAL at 14:33

## 2024-01-08 ASSESSMENT — PAIN - FUNCTIONAL ASSESSMENT: PAIN_FUNCTIONAL_ASSESSMENT: NONE - DENIES PAIN

## 2024-01-08 NOTE — DISCHARGE INSTRUCTIONS
[facility]  Special Procedures/Radiology Department      Steroidal Injection      Go home and rest.     No vigorous physical activity today.    Be aware that numbness and/or tingling can occur up to 24 hours after the injection.    No driving today.    Resume your previous diet.    Resume your previous medications.     Depending on your job, you may return to work in 24 to 48 hours.     It may take up to one week after the injection to see a change or an improvement in your symptoms.    Be sure to follow up with your physician.  Tell your physician if the injection helped with your symptoms or if the injection did nothing for your symptoms.    For minor discomfort, you can take Tylenol, as directed on the label.      If you have any questions or concerns, please call [phone] and ask for the nurse on-call    Other:

## (undated) DEVICE — KIT ANGIOGRAPHY CUST MRMC

## (undated) DEVICE — HEART CATH-MRMC: Brand: MEDLINE INDUSTRIES, INC.

## (undated) DEVICE — TUBING PRSS MON L6IN PVC M FEM CONN

## (undated) DEVICE — CHECK-FLO INTRODUCER SET: Brand: PERFORMER

## (undated) DEVICE — PINNACLE INTRODUCER SHEATH: Brand: PINNACLE

## (undated) DEVICE — 1 X VERSACROSS TRANSSEPTAL SHEATH (INCLUDING  1 X J-TIP GUIDEWIRE); 1 X VERSACROSS RF WIRE (INCLUDING 1 X CONNECTOR CABLE (SINGLE USE)); 1 X DISPERSIVE ELECTRODE: Brand: VERSACROSS ACCESS SOLUTION

## (undated) DEVICE — PERCLOSE PROGLIDE™ SUTURE-MEDIATED CLOSURE SYSTEM: Brand: PERCLOSE PROGLIDE™

## (undated) DEVICE — CATHETER ANGIO JR4 PIG 145 DEG 6 FRX100 CM MP SUPER TORQUE +

## (undated) DEVICE — MEDI-TRACE CADENCE ADULT, DEFIBRILLATION ELECTRODE -RTS  (10 PR/PK) - PHYSIO-CONTROL: Brand: MEDI-TRACE CADENCE

## (undated) DEVICE — REM POLYHESIVE ADULT PATIENT RETURN ELECTRODE: Brand: VALLEYLAB

## (undated) DEVICE — PRESSURE MONITORING SET: Brand: TRUWAVE